# Patient Record
Sex: MALE | Race: WHITE | Employment: FULL TIME | URBAN - METROPOLITAN AREA
[De-identification: names, ages, dates, MRNs, and addresses within clinical notes are randomized per-mention and may not be internally consistent; named-entity substitution may affect disease eponyms.]

---

## 2018-08-13 ENCOUNTER — OFFICE VISIT (OUTPATIENT)
Dept: FAMILY MEDICINE | Facility: CLINIC | Age: 23
End: 2018-08-13
Payer: COMMERCIAL

## 2018-08-13 VITALS
RESPIRATION RATE: 16 BRPM | WEIGHT: 212.8 LBS | DIASTOLIC BLOOD PRESSURE: 74 MMHG | SYSTOLIC BLOOD PRESSURE: 110 MMHG | OXYGEN SATURATION: 100 % | BODY MASS INDEX: 30.46 KG/M2 | HEIGHT: 70 IN | HEART RATE: 64 BPM | TEMPERATURE: 97.7 F

## 2018-08-13 DIAGNOSIS — R19.7 DIARRHEA, UNSPECIFIED TYPE: Primary | ICD-10-CM

## 2018-08-13 LAB
ALBUMIN SERPL-MCNC: 4.2 G/DL (ref 3.4–5)
ALP SERPL-CCNC: 91 U/L (ref 40–150)
ALT SERPL W P-5'-P-CCNC: 21 U/L (ref 0–70)
ANION GAP SERPL CALCULATED.3IONS-SCNC: 8 MMOL/L (ref 3–14)
AST SERPL W P-5'-P-CCNC: 12 U/L (ref 0–45)
BILIRUB SERPL-MCNC: 0.5 MG/DL (ref 0.2–1.3)
BUN SERPL-MCNC: 13 MG/DL (ref 7–30)
C COLI+JEJUNI+LARI FUSA STL QL NAA+PROBE: NOT DETECTED
CALCIUM SERPL-MCNC: 9.1 MG/DL (ref 8.5–10.1)
CHLORIDE SERPL-SCNC: 106 MMOL/L (ref 94–109)
CO2 SERPL-SCNC: 27 MMOL/L (ref 20–32)
CREAT SERPL-MCNC: 0.86 MG/DL (ref 0.66–1.25)
EC STX1 GENE STL QL NAA+PROBE: NOT DETECTED
EC STX2 GENE STL QL NAA+PROBE: NOT DETECTED
ENTERIC PATHOGEN COMMENT: NORMAL
ERYTHROCYTE [DISTWIDTH] IN BLOOD BY AUTOMATED COUNT: 12 % (ref 10–15)
ERYTHROCYTE [SEDIMENTATION RATE] IN BLOOD BY WESTERGREN METHOD: 5 MM/H (ref 0–15)
GFR SERPL CREATININE-BSD FRML MDRD: >90 ML/MIN/1.7M2
GLUCOSE SERPL-MCNC: 85 MG/DL (ref 70–99)
HCT VFR BLD AUTO: 42.9 % (ref 40–53)
HGB BLD-MCNC: 15.3 G/DL (ref 13.3–17.7)
MCH RBC QN AUTO: 29.7 PG (ref 26.5–33)
MCHC RBC AUTO-ENTMCNC: 35.7 G/DL (ref 31.5–36.5)
MCV RBC AUTO: 83 FL (ref 78–100)
NOROV GI+II ORF1-ORF2 JNC STL QL NAA+PR: NOT DETECTED
PLATELET # BLD AUTO: 272 10E9/L (ref 150–450)
POTASSIUM SERPL-SCNC: 3.8 MMOL/L (ref 3.4–5.3)
PROT SERPL-MCNC: 7.5 G/DL (ref 6.8–8.8)
RBC # BLD AUTO: 5.16 10E12/L (ref 4.4–5.9)
RVA NSP5 STL QL NAA+PROBE: NOT DETECTED
SALMONELLA SP RPOD STL QL NAA+PROBE: NOT DETECTED
SHIGELLA SP+EIEC IPAH STL QL NAA+PROBE: NOT DETECTED
SODIUM SERPL-SCNC: 141 MMOL/L (ref 133–144)
V CHOL+PARA RFBL+TRKH+TNAA STL QL NAA+PR: NOT DETECTED
WBC # BLD AUTO: 5.4 10E9/L (ref 4–11)
Y ENTERO RECN STL QL NAA+PROBE: NOT DETECTED

## 2018-08-13 PROCEDURE — 80053 COMPREHEN METABOLIC PANEL: CPT | Performed by: PHYSICIAN ASSISTANT

## 2018-08-13 PROCEDURE — 85652 RBC SED RATE AUTOMATED: CPT | Performed by: PHYSICIAN ASSISTANT

## 2018-08-13 PROCEDURE — 87177 OVA AND PARASITES SMEARS: CPT | Performed by: PHYSICIAN ASSISTANT

## 2018-08-13 PROCEDURE — 87506 IADNA-DNA/RNA PROBE TQ 6-11: CPT | Performed by: PHYSICIAN ASSISTANT

## 2018-08-13 PROCEDURE — 87209 SMEAR COMPLEX STAIN: CPT | Performed by: PHYSICIAN ASSISTANT

## 2018-08-13 PROCEDURE — 99203 OFFICE O/P NEW LOW 30 MIN: CPT | Performed by: PHYSICIAN ASSISTANT

## 2018-08-13 PROCEDURE — 85027 COMPLETE CBC AUTOMATED: CPT | Performed by: PHYSICIAN ASSISTANT

## 2018-08-13 PROCEDURE — 36415 COLL VENOUS BLD VENIPUNCTURE: CPT | Performed by: PHYSICIAN ASSISTANT

## 2018-08-13 PROCEDURE — 87329 GIARDIA AG IA: CPT | Performed by: PHYSICIAN ASSISTANT

## 2018-08-13 NOTE — MR AVS SNAPSHOT
After Visit Summary   8/13/2018    Deion Richards    MRN: [id number]           Patient Information     Date Of Birth          1995        Visit Information        Provider Department      8/13/2018 8:30 AM Samantha Brantley PA-C [facility]        Today's Diagnoses     Diarrhea, unspecified type    -  1       Follow-ups after your visit        Follow-up notes from your care team     Return in about 1 week (around 8/20/2018) for If symptoms worsen or fail to improve.      Your next 10 appointments already scheduled     Aug 13, 2018  8:30 AM CDT   Office Visit with LUIS MANUEL [place] [facility] ([facility])    [address] [address] [phone]           Bring a current list of meds and any records pertaining to this visit. For Physicals, please bring immunization records and any forms needing to be filled out. Please arrive 10 minutes early to complete paperwork.              Future tests that were ordered for you today     Open Future Orders        Priority Expected Expires Ordered    Enteric Bacteria and Virus Panel by SHONA Stool Routine  8/13/2019 8/13/2018    Ova and Parasite Exam Routine Routine  8/13/2019 8/13/2018            Who to contact     If you have questions or need follow up information about today's clinic visit or your schedule please contact [facility] directly at [phone].  Normal or non-critical lab and imaging results will be communicated to you by MyChart, letter or phone within 4 business days after the clinic has received the results. If you do not hear from us within 7 days, please contact the clinic through MyChart or phone. If you have a critical or abnormal lab result, we will notify you by phone as soon as possible.  Submit refill requests through [employer] or call your pharmacy and they will forward the refill request to us. Please allow 3 business days for  "your refill to be completed.          Additional Information About Your Visit        [employer] Interactive[employer] Information     Re[employer]Lync lets you send messages to your doctor, view your test results, renew your prescriptions, schedule appointments and more. To sign up, go to [email or web address] . Click on \"Log in\" on the left side of the screen, which will take you to the Welcome page. Then click on \"Sign up Now\" on the right side of the page.     You will be asked to enter the access code listed below, as well as some personal information. Please follow the directions to create your username and password.     Your access code is: [email or web address]  Expires: 2018  7:51 AM     Your access code will  in 90 days. If you need help or a new code, please call your [place] clinic or [phone].        Care EveryWhere ID     This is your [facility] EveryWhere ID. This could be used by other organizations to access your [place] medical records  [id number]        Your Vitals Were     Pulse Temperature Respirations Height Pulse Oximetry BMI (Body Mass Index)    64 97.7  F (36.5  C) (Oral) 16 5' 10\" (1.778 m) 100% 30.53 kg/m2       Blood Pressure from Last 3 Encounters:   18 110/74   12 116/66    Weight from Last 3 Encounters:   18 212 lb 12.8 oz (96.5 kg)   12 165 lb (74.8 kg) (77 %)*   11 164 lb 4 oz (74.5 kg) (79 %)*     * Growth percentiles are based on CDC 2-20 Years data.              We Performed the Following     CBC with platelets     Comprehensive metabolic panel     ESR: Erythrocyte sedimentation rate        Primary Care Provider Office Phone # Fax #    [facility] [phone]       [address]  [facility] [address]        Equal Access to Services     SABA RENEE : Shun Meehan, waroseda luqadaha, qaybta kaalneeraj irizarry . [facility] [phone].    ATENCIÓN: Si yuri albrecht, tiene a frankel disposición " servicios gratuitos de asistencia lingüística. Rekha godwin [phone].    We comply with applicable federal civil rights laws and [place] laws. We do not discriminate on the basis of race, color, national origin, age, disability, sex, sexual orientation, or gender identity.            Thank you!     Thank you for choosing [facility]  for your care. Our goal is always to provide you with excellent care. Hearing back from our patients is one way we can continue to improve our services. Please take a few minutes to complete the written survey that you may receive in the mail after your visit with us. Thank you!             Your Updated Medication List - Protect others around you: Learn how to safely use, store and throw away your medicines at www.disposemymeds.org.          This list is accurate as of 8/13/18  7:51 AM.  Always use your most recent med list.                   Brand Name Dispense Instructions for use Diagnosis    NO ACTIVE MEDICATIONS

## 2018-08-13 NOTE — PROGRESS NOTES
SUBJECTIVE:   Deion Richards is a 22 year old male who presents to clinic today for the following health issues:      Diarrhea      Duration: one week    Description:       Consistency of stool: runny, loose, yellow and green       Blood in stool: no        Number of loose stools past 24 hours: 5    Intensity:  moderate    Accompanying signs and symptoms:       Fever: no        Nausea/vomitting: no        Abdominal pain: YES- only before he has a BM then it goes away       Weight loss: no     History (recent antibiotics or travel/ill contacts/med changes/testing done): none    Precipitating or alleviating factors: None    Therapies tried and outcome: increase fluids and OTC probiotic didn't help    Patient is here today for evaluation of diarrhea  Ongoing for about 1 week  He notes that the diarrhea started out of the blue  He has been having about 4-6 episodes per day, started as yellow diarrhea, now more green, without bloody stools  Denies fever, chills, nausea or vomiting  Admits to some crampy abdominal pain prior to loose stools otherwise no pain  No urinary complaints  Is taking nothing for this    No recent travel, no antibiotic use, no one else is sick at home  Does have family hx of UC  He does admit to drinking out of a garden hose recently    Problem list and histories reviewed & adjusted, as indicated.  Additional history: as documented    Patient Active Problem List   Diagnosis     Knee pain     Patellar tendonitis     History reviewed. No pertinent surgical history.    Social History   Substance Use Topics     Smoking status: Never Smoker     Smokeless tobacco: Never Used     Alcohol use Yes      Comment: wine     Family History   Problem Relation Age of Onset     Ulcerative Colitis Father      Ulcerative Colitis Maternal Grandfather      Colon Cancer Maternal Grandfather      Diabetes Maternal Grandfather      Obsessive Compulsive Disorder Maternal Grandfather      Asthma Maternal Grandfather       "    Current Outpatient Prescriptions   Medication Sig Dispense Refill     NO ACTIVE MEDICATIONS        Allergies   Allergen Reactions     Augmentin        Reviewed and updated as needed this visit by clinical staff  Tobacco  Allergies  Meds  Med Hx  Surg Hx  Fam Hx  Soc Hx      Reviewed and updated as needed this visit by Provider         ROS:  Constitutional, HEENT, cardiovascular, pulmonary, gi and gu systems are negative, except as otherwise noted.    OBJECTIVE:     /74 (BP Location: Right arm, Patient Position: Chair, Cuff Size: Adult Large)  Pulse 64  Temp 97.7  F (36.5  C) (Oral)  Resp 16  Ht 5' 10\" (1.778 m)  Wt 212 lb 12.8 oz (96.5 kg)  SpO2 100%  BMI 30.53 kg/m2  Body mass index is 30.53 kg/(m^2).  GENERAL: healthy, alert and no distress  NECK: no adenopathy, no asymmetry, masses, or scars and thyroid normal to palpation  RESP: lungs clear to auscultation - no rales, rhonchi or wheezes  CV: regular rate and rhythm, normal S1 S2, no S3 or S4, no murmur, click or rub, no peripheral edema and peripheral pulses strong  ABDOMEN: soft, nontender, no hepatosplenomegaly, no masses and bowel sounds normal  MS: no gross musculoskeletal defects noted, no edema    Diagnostic Test Results:  none     ASSESSMENT/PLAN:             1. Diarrhea, unspecified type  New problem, unclear cause.  Will obtain labs and stool studies, including O/P and Giardia.  Recommend BRAT diet, pushing fluids and avoidance of anti-diarrheal medication.  Recommend if fever, bloody stools, or if abdominal pain go to ER in interim.  - Enteric Bacteria and Virus Panel by SHOAN Stool; Future  - Ova and Parasite Exam Routine; Future  - Comprehensive metabolic panel  - CBC with platelets  - ESR: Erythrocyte sedimentation rate  - Giardia antigen; Future    Risks, benefits and alternatives were discussed with patient. Agreeable to the plan of care.      Samantha Brantley PA-C  [facility]  "

## 2018-08-14 LAB
G LAMBLIA AG STL QL IA: NORMAL
O+P STL MICRO: NORMAL
O+P STL MICRO: NORMAL
SPECIMEN SOURCE: NORMAL
SPECIMEN SOURCE: NORMAL

## 2018-08-15 ENCOUNTER — TELEPHONE (OUTPATIENT)
Dept: FAMILY MEDICINE | Facility: CLINIC | Age: 23
End: 2018-08-15

## 2018-08-15 NOTE — TELEPHONE ENCOUNTER
Please call patient:     Stool testing was all negative, is he continuing to have diarrhea?     There is one more stool test we could run for C diff, though unlikely given history. And if that is negative and symptoms persist, would recommend GI referral.     Please see if patient would like to do the last stool test, if so route to me to place.      Samantha Brantley PA-C     1st attempt, LM for pt to call back.  Lynne Giles CMA ([facility])

## 2018-08-15 NOTE — TELEPHONE ENCOUNTER
Patient informed of below.   Symptoms are improving. Will call if symptoms return or do not completely resolve.

## 2021-03-19 PROCEDURE — 93005 ELECTROCARDIOGRAM TRACING: CPT

## 2021-03-19 PROCEDURE — 250N000013 HC RX MED GY IP 250 OP 250 PS 637: Performed by: EMERGENCY MEDICINE

## 2021-03-19 PROCEDURE — 99283 EMERGENCY DEPT VISIT LOW MDM: CPT

## 2021-03-19 RX ORDER — DIPHENHYDRAMINE HCL 25 MG
50 CAPSULE ORAL ONCE
Status: COMPLETED | OUTPATIENT
Start: 2021-03-19 | End: 2021-03-19

## 2021-03-19 RX ADMIN — DIPHENHYDRAMINE HYDROCHLORIDE 50 MG: 25 CAPSULE ORAL at 21:50

## 2021-03-20 ENCOUNTER — HOSPITAL ENCOUNTER (EMERGENCY)
Facility: CLINIC | Age: 26
Discharge: HOME | End: 2021-03-20
Attending: EMERGENCY MEDICINE | Admitting: EMERGENCY MEDICINE

## 2021-03-20 VITALS
WEIGHT: 215 LBS | RESPIRATION RATE: 16 BRPM | DIASTOLIC BLOOD PRESSURE: 77 MMHG | OXYGEN SATURATION: 100 % | BODY MASS INDEX: 30.85 KG/M2 | HEART RATE: 80 BPM | TEMPERATURE: 98.5 F | SYSTOLIC BLOOD PRESSURE: 138 MMHG

## 2021-03-20 DIAGNOSIS — R00.2 PALPITATIONS: ICD-10-CM

## 2021-03-20 DIAGNOSIS — T78.40XA ALLERGIC REACTION, INITIAL ENCOUNTER: ICD-10-CM

## 2021-03-20 RX ORDER — CETIRIZINE HYDROCHLORIDE 10 MG/1
10 TABLET ORAL 2 TIMES DAILY PRN
Qty: 20 TABLET | Refills: 0 | Status: SHIPPED | OUTPATIENT
Start: 2021-03-20 | End: 2021-03-30

## 2021-03-20 ASSESSMENT — ENCOUNTER SYMPTOMS
NAUSEA: 0
VOMITING: 0
NERVOUS/ANXIOUS: 1
PALPITATIONS: 1
ABDOMINAL PAIN: 0
COLOR CHANGE: 1
TROUBLE SWALLOWING: 1
SHORTNESS OF BREATH: 0

## 2021-03-20 NOTE — ED TRIAGE NOTES
Pt arrives with complaints of L neck itchiness and swelling that he noticed fairly soon after leaving a store this afternoon around 1400. Pt denies new exposures, had a regular day he reports. SKin is erythematous and warm with exterior swelling, but says that swallowing has been more difficult since about 1800. Says eyes are itchy, clearing throat in triage. Pt also endorses anxiety. ABCs intact, A/O x4.

## 2021-03-20 NOTE — ED PROVIDER NOTES
History     Chief Complaint:  Neck Swelling and Allergic Reaction      HPI  Deion Richards is a 25 year old male who presents to the emergency department for evaluation of a rash. Patient states at 2pm today he had a sudden onset of itchiness and redness on the left side of his neck. He used lotion which temporarily improved his symptoms. However, the redness and itchiness worsened and he began feeling like his throat was closing, making it difficult for him to swallow. He denies difficulty breathing. He subsequently presented for evaluation. He denies any known allergies other than Augmentin. No recent antibiotics or medications. No abdominal pain, nausea, and vomiting. No history of asthma or eczema. No family history of anaphylaxis. He states this has never happened before.    Of note, patient had COVID in early February and since recovering he has had increased anxiety with intermittent episodes of palpitations, worse when lying down. He notes these symptoms occured while exerting himself at the gym.    Review of Systems   HENT: Positive for trouble swallowing.    Respiratory: Negative for shortness of breath.    Cardiovascular: Positive for palpitations.   Gastrointestinal: Negative for abdominal pain, nausea and vomiting.   Skin: Positive for color change and rash.   Psychiatric/Behavioral: The patient is nervous/anxious.    All other systems reviewed and are negative.    Allergies:  Augmentin    Medications:    The patient is not currently taking any prescribed medications.    Past Medical History:    The patient does not have any past medical history.    Family History:    UC    Social History:  The patient presents to the emergency department alone.  The patient is employed.      Physical Exam     Patient Vitals for the past 24 hrs:   BP Temp Temp src Pulse Resp SpO2 Weight   03/20/21 0124 138/77 -- -- 80 16 100 % --   03/20/21 0030 -- -- -- -- -- 98 % --   03/19/21 2115 (!) 149/85 98.5  F (36.9  C)  Temporal 65 20 100 % 97.5 kg (215 lb)         Physical Exam  Eyes: No discharge, symmetrical lids  ENT: Moist mucous membranes, no ear discharge. No angioedema. Voice normal. No stridor.  Neck: Full range of motion  Respiratory: CTAB, no wheezes  Cardiovascular: Regular rate and rhythm, no lower extremity edema  Chest: Equal rise  Gastrointestinal: Soft. Nondistended. NTTP. No rebound or guarding  Musculoskeletal: No gross deformities.   Skin: Warm and well perfused. Faint erythematous blanching nontender rash to lateral L neck.  Neurologic: Moves all extremities, speech fluent without dysarthria  Psychiatric: Appropriate affect, alert and interactive    Emergency Department Course   ECG  ECG taken at 111, ECG read at 112  Normal sinus rhythm with sinus arrhythmia. Incomplete RBBB. Borderline ECG.  No prior EKG.  Rate 74 bpm. SD interval 140 ms. QRS duration 116 ms. QT/QTc 384/426 ms. P-R-T axes 26 63 54.     Emergency Department Course:  Reviewed:  I reviewed the patient's nursing notes, vitals, past medical records, Care Everywhere.     Assessments:  0055 I assessed the patient. Exam findings described above.    118 I reassessed and updated the patient.     Interventions:  2150 Benadryl 50 mg Oral     Disposition:  Discharged to home.    Impression & Plan    Medical Decision Making:  Deion Richards is a 25 year old male who presents for evaluation of rash.  This is likely consistent with allergic phenomena.  This appears to be at this point an isolated rash without signs of angioedema, respiratory compromise, shock, serious systemic reaction, etc.  He looks overall  well here initially and after observation with detailed physical exam and history to look for a more serious allergic reaction/anaphylaxis.  No signs of serious infection, cellulitis, vasculitis, or other skin manifestation of a serious underlying disease.  Supportive outpatient management is indicated, medications for discharge noted above.  Close  followup with primary care physician.  Return if  wheezing, progressive shortness of breath, facial or throat/tongue swelling.      Patient also expressed concern for palpitations. ECG shows no ischemic changes.  A broad differential diagnosis was considered including SVT, Atrial fibrillation, ventricular arrhythmia, thyroid disease, acute electrolyte abnormality,  drugs/medications, caffeine intake or other stimulants, medication side effect, anemia, heart disease, PE, etc.  The workup and exam here in ED would indicate that supportive outpatient management is indicated.  I doubt PE as patient does not have chest pain or respiratory complaints.  Doubt acute coronary syndrome given symptoms and exam.  PT has a home Cardia monitor; viewed his telemetry strip on his mobile phone, which demonstrates normal sinus rhythm during his episodes.  Will have them follow up with PCP.        Diagnosis:    ICD-10-CM    1. Allergic reaction, initial encounter  T78.40XA    2. Palpitations  R00.2        Discharge Medications:  Discharge Medication List as of 3/20/2021  1:22 AM      START taking these medications    Details   cetirizine (ZYRTEC) 10 MG tablet Take 1 tablet (10 mg) by mouth 2 times daily as needed for allergies (1 tab up to twice a day as needed for itch, rash, hives, allergy), Disp-20 tablet, R-0, Local Print               Maximilian Hutchinson  3/20/2021   EMERGENCY DEPARTMENT  Scribe Disclosure:  I, Maximilian Hutchinson, am serving as a scribe at 12:55 AM on 3/20/2021 to document services personally performed by Dawood Landa MD based on my observations and the provider's statements to me.        Dawood Landa MD  03/20/21 0126       Dawood Landa MD  03/20/21 0127

## 2021-03-22 LAB — INTERPRETATION ECG - MUSE: NORMAL

## 2021-04-02 ENCOUNTER — OFFICE VISIT (OUTPATIENT)
Dept: FAMILY MEDICINE | Facility: CLINIC | Age: 26
End: 2021-04-02
Payer: COMMERCIAL

## 2021-04-02 VITALS
HEIGHT: 70 IN | TEMPERATURE: 98 F | RESPIRATION RATE: 16 BRPM | OXYGEN SATURATION: 98 % | DIASTOLIC BLOOD PRESSURE: 70 MMHG | BODY MASS INDEX: 29.84 KG/M2 | WEIGHT: 208.4 LBS | HEART RATE: 77 BPM | SYSTOLIC BLOOD PRESSURE: 120 MMHG

## 2021-04-02 DIAGNOSIS — Z00.00 ROUTINE GENERAL MEDICAL EXAMINATION AT A HEALTH CARE FACILITY: Primary | ICD-10-CM

## 2021-04-02 DIAGNOSIS — F41.9 ANXIETY: ICD-10-CM

## 2021-04-02 PROCEDURE — 99213 OFFICE O/P EST LOW 20 MIN: CPT | Mod: 25 | Performed by: NURSE PRACTITIONER

## 2021-04-02 PROCEDURE — 99395 PREV VISIT EST AGE 18-39: CPT | Performed by: NURSE PRACTITIONER

## 2021-04-02 ASSESSMENT — ENCOUNTER SYMPTOMS
MYALGIAS: 1
ARTHRALGIAS: 1
FEVER: 0
WEAKNESS: 0
COUGH: 0
HEMATURIA: 0
HEADACHES: 0
JOINT SWELLING: 0
DIZZINESS: 0
ABDOMINAL PAIN: 0
HEARTBURN: 0
HEMATOCHEZIA: 0
PARESTHESIAS: 0
CONSTIPATION: 0
CHILLS: 0
FREQUENCY: 0
EYE PAIN: 0
PALPITATIONS: 1
DYSURIA: 0
SHORTNESS OF BREATH: 0
SORE THROAT: 0
DIARRHEA: 0
NAUSEA: 0
NERVOUS/ANXIOUS: 1

## 2021-04-02 ASSESSMENT — ANXIETY QUESTIONNAIRES
7. FEELING AFRAID AS IF SOMETHING AWFUL MIGHT HAPPEN: NEARLY EVERY DAY
5. BEING SO RESTLESS THAT IT IS HARD TO SIT STILL: SEVERAL DAYS
3. WORRYING TOO MUCH ABOUT DIFFERENT THINGS: NEARLY EVERY DAY
GAD7 TOTAL SCORE: 12
2. NOT BEING ABLE TO STOP OR CONTROL WORRYING: MORE THAN HALF THE DAYS
1. FEELING NERVOUS, ANXIOUS, OR ON EDGE: MORE THAN HALF THE DAYS
6. BECOMING EASILY ANNOYED OR IRRITABLE: NOT AT ALL
IF YOU CHECKED OFF ANY PROBLEMS ON THIS QUESTIONNAIRE, HOW DIFFICULT HAVE THESE PROBLEMS MADE IT FOR YOU TO DO YOUR WORK, TAKE CARE OF THINGS AT HOME, OR GET ALONG WITH OTHER PEOPLE: SOMEWHAT DIFFICULT

## 2021-04-02 ASSESSMENT — PATIENT HEALTH QUESTIONNAIRE - PHQ9: 5. POOR APPETITE OR OVEREATING: SEVERAL DAYS

## 2021-04-02 ASSESSMENT — MIFFLIN-ST. JEOR: SCORE: 1931.55

## 2021-04-02 NOTE — PROGRESS NOTES
"SUBJECTIVE:   CC: Deion Richards is an 25 year old male who presents for preventative health visit.       Patient has been advised of split billing requirements and indicates understanding: Yes  Healthy Habits:     Getting at least 3 servings of Calcium per day:  Yes    Bi-annual eye exam:  NO    Dental care twice a year:  NO    Sleep apnea or symptoms of sleep apnea:  None    Diet:  Regular (no restrictions)    Frequency of exercise:  4-5 days/week    Duration of exercise:  Greater than 60 minutes    Taking medications regularly:  Not Applicable    Medication side effects:  Not applicable    PHQ-2 Total Score: 2    Additional concerns today:  Yes    Had Covid early February.    Symptoms lasted 30-40 hours.   Started to have palpitations 1.5 weeks after diagnosed with Covid.   Has been having intermittent palpitations, better now.    Palpitations were very pronounced with exercise, which \"freaked\" him out.    When doing the laundry recently he checked HR with a home machine and it said \"tachycardia\".  Hasn't been back to the gym since Covid diagnosis.   Stress and anxiety seem to trigger palpitations.   Has felt anxious in the past, but never been diagnosed or treated with medication.   He was talking with spouse and thinks it may be time to start medication for anxiety.   Family hx of anxiety.   Doesn't drink caffeine.   Recent normal EKG in the ER.  Was seen yesterday at  with cardiology, labs were normal.   Cardiology was not concerned about his symptoms.   For peace of mind is going to do a stress test.    Sleeping well at night.     CRISTOBAL-7 SCORE 4/2/2021   Total Score 12       Today's PHQ-2 Score:   PHQ-2 ( 1999 Pfizer) 4/2/2021   Q1: Little interest or pleasure in doing things 1   Q2: Feeling down, depressed or hopeless 1   PHQ-2 Score 2   Q1: Little interest or pleasure in doing things Several days   Q2: Feeling down, depressed or hopeless Several days   PHQ-2 Score 2       Abuse: Current or Past(Physical, " Sexual or Emotional)- No  Do you feel safe in your environment? Yes    Have you ever done Advance Care Planning? (For example, a Health Directive, POLST, or a discussion with a medical provider or your loved ones about your wishes): No, advance care planning information given to patient to review.  Patient declined advance care planning discussion at this time.    Social History     Tobacco Use     Smoking status: Never Smoker     Smokeless tobacco: Never Used   Substance Use Topics     Alcohol use: Yes     Comment: wine or bourbon or beer- once a week, if not at all.         Alcohol Use 4/2/2021   Prescreen: >3 drinks/day or >7 drinks/week? No       Last PSA: No results found for: PSA    Reviewed orders with patient. Reviewed health maintenance and updated orders accordingly - Yes  Labs reviewed in EPIC  BP Readings from Last 3 Encounters:   04/02/21 120/70   03/20/21 138/77   08/13/18 110/74    Wt Readings from Last 3 Encounters:   04/02/21 94.5 kg (208 lb 6.4 oz)   03/19/21 97.5 kg (215 lb)   08/13/18 96.5 kg (212 lb 12.8 oz)                  Current Outpatient Medications   Medication Sig Dispense Refill     sertraline (ZOLOFT) 50 MG tablet Take 1 tablet (50 mg) by mouth daily Take 1/2 tab by mouth for 5 days then start 1 tab daily 30 tablet 1     NO ACTIVE MEDICATIONS        Allergies   Allergen Reactions     Augmentin        Reviewed and updated as needed this visit by clinical staff  Tobacco  Allergies  Meds  Problems  Med Hx  Surg Hx  Fam Hx  Soc Hx          Reviewed and updated as needed this visit by Provider  Tobacco  Allergies  Meds  Problems  Med Hx  Surg Hx  Fam Hx         History reviewed. No pertinent past medical history.   History reviewed. No pertinent surgical history.    Review of Systems   Constitutional: Negative for chills and fever.   HENT: Negative for congestion, ear pain and sore throat.    Eyes: Negative for pain and visual disturbance.   Respiratory: Negative for cough and  "shortness of breath.    Cardiovascular: Positive for palpitations. Negative for chest pain and peripheral edema.   Gastrointestinal: Negative for abdominal pain, constipation, diarrhea, heartburn, hematochezia and nausea.   Genitourinary: Negative for dysuria, frequency, genital sores, hematuria and urgency.   Musculoskeletal: Positive for arthralgias and myalgias. Negative for joint swelling.   Skin: Negative for rash.   Neurological: Negative for dizziness, weakness, headaches and paresthesias.   Psychiatric/Behavioral: Negative for mood changes. The patient is nervous/anxious.        OBJECTIVE:   /70 (BP Location: Right arm, Patient Position: Sitting, Cuff Size: Adult Regular)   Pulse 77   Temp 98  F (36.7  C) (Oral)   Resp 16   Ht 1.778 m (5' 10\")   Wt 94.5 kg (208 lb 6.4 oz)   SpO2 98%   BMI 29.90 kg/m      Physical Exam  GENERAL: healthy, alert and no distress  EYES: Eyes grossly normal to inspection, PERRL and conjunctivae and sclerae normal  HENT: ear canals and TM's normal, nose and mouth without ulcers or lesions  NECK: no adenopathy, no asymmetry, masses, or scars and thyroid normal to palpation  RESP: lungs clear to auscultation - no rales, rhonchi or wheezes  CV: regular rate and rhythm, normal S1 S2, no S3 or S4, no murmur, click or rub, no peripheral edema and peripheral pulses strong  ABDOMEN: soft, nontender, no hepatosplenomegaly, no masses and bowel sounds normal  MS: no gross musculoskeletal defects noted, no edema  SKIN: no suspicious lesions or rashes  NEURO: Normal strength and tone, mentation intact and speech normal  PSYCH: mentation appears normal, affect normal/bright    Diagnostic Test Results:  Labs reviewed in Epic    ASSESSMENT/PLAN:   1. Routine general medical examination at a health care facility  Normal exam today.     2. Anxiety  New dx.  Starting medication; risks, benefits and side effects discussed.  Follow-up 3-4 weeks, video appt.   - sertraline (ZOLOFT) 50 MG " "tablet; Take 1 tablet (50 mg) by mouth daily Take 1/2 tab by mouth for 5 days then start 1 tab daily  Dispense: 30 tablet; Refill: 1      COUNSELING:   Reviewed preventive health counseling, as reflected in patient instructions       Regular exercise       Healthy diet/nutrition       Consider Hep C screening for all patients one time for ages 18-79 years       HIV screeninx in teen years, 1x in adult years, and at intervals if high risk    Estimated body mass index is 29.9 kg/m  as calculated from the following:    Height as of this encounter: 1.778 m (5' 10\").    Weight as of this encounter: 94.5 kg (208 lb 6.4 oz).     Weight management plan: Discussed healthy diet and exercise guidelines    He reports that he has never smoked. He has never used smokeless tobacco.      Counseling Resources:  ATP IV Guidelines  Pooled Cohorts Equation Calculator  FRAX Risk Assessment  ICSI Preventive Guidelines  Dietary Guidelines for Americans,   USDA's MyPlate  ASA Prophylaxis  Lung CA Screening    PHILIP Mendez CNP  [facility]  "

## 2021-04-03 ASSESSMENT — ANXIETY QUESTIONNAIRES: GAD7 TOTAL SCORE: 12

## 2021-06-07 DIAGNOSIS — F41.9 ANXIETY: ICD-10-CM

## 2021-06-11 NOTE — TELEPHONE ENCOUNTER
Patient has appt scheduled.     Prescription approved per [facility] protocol.    Brenda Canela RN on 6/11/2021 at 2:06 PM

## 2021-06-16 ENCOUNTER — VIRTUAL VISIT (OUTPATIENT)
Dept: FAMILY MEDICINE | Facility: CLINIC | Age: 26
End: 2021-06-16
Payer: COMMERCIAL

## 2021-06-16 DIAGNOSIS — F41.9 ANXIETY: ICD-10-CM

## 2021-06-16 PROCEDURE — 99213 OFFICE O/P EST LOW 20 MIN: CPT | Mod: GT | Performed by: NURSE PRACTITIONER

## 2021-06-16 ASSESSMENT — ANXIETY QUESTIONNAIRES
7. FEELING AFRAID AS IF SOMETHING AWFUL MIGHT HAPPEN: NOT AT ALL
1. FEELING NERVOUS, ANXIOUS, OR ON EDGE: SEVERAL DAYS
GAD7 TOTAL SCORE: 1
5. BEING SO RESTLESS THAT IT IS HARD TO SIT STILL: NOT AT ALL
6. BECOMING EASILY ANNOYED OR IRRITABLE: NOT AT ALL
IF YOU CHECKED OFF ANY PROBLEMS ON THIS QUESTIONNAIRE, HOW DIFFICULT HAVE THESE PROBLEMS MADE IT FOR YOU TO DO YOUR WORK, TAKE CARE OF THINGS AT HOME, OR GET ALONG WITH OTHER PEOPLE: NOT DIFFICULT AT ALL
3. WORRYING TOO MUCH ABOUT DIFFERENT THINGS: NOT AT ALL
2. NOT BEING ABLE TO STOP OR CONTROL WORRYING: NOT AT ALL

## 2021-06-16 ASSESSMENT — PATIENT HEALTH QUESTIONNAIRE - PHQ9
5. POOR APPETITE OR OVEREATING: NOT AT ALL
SUM OF ALL RESPONSES TO PHQ QUESTIONS 1-9: 0

## 2021-06-16 NOTE — PROGRESS NOTES
"Deion is a 25 year old who is being evaluated via a billable video visit.      How would you like to obtain your AVS? MyChart  If the video visit is dropped, the invitation should be resent by: Text to cell phone: [phone]  Will anyone else be joining your video visit? No      Video Start Time: 1:46 PM    Assessment & Plan     Anxiety  Much improved since starting zoloft.  Will continue on current dose and follow-up in 1 year; sooner prn.   - sertraline (ZOLOFT) 50 MG tablet; Take 1 tablet (50 mg) by mouth daily                 Return in about 1 year (around 6/16/2022) for Preventive Visit, anxiety follow up , In-Clinic Visit.    PHILIP Mendez CNP  [facility]    Subjective   Deion is a 25 year old who presents for the following health issues     HPI     Medication Followup of zoloft 50 mg     Taking Medication as prescribed: yes    Side Effects:  None    Medication Helping Symptoms:  yes     CRISTOBAL-7 SCORE 4/2/2021 6/16/2021   Total Score 12 1     PHQ 6/16/2021   PHQ-9 Total Score 0   Q9: Thoughts of better off dead/self-harm past 2 weeks Not at all     He has felt \"99% better\" since starting zoloft.  He is feeling calm and HR is no longer high.  He is no longer feeling palpitations (unless doing an intense cardio workout).  Work is going well.  Denies side effects.      Review of Systems   Constitutional, HEENT, cardiovascular, pulmonary, gi and gu and psych systems are negative, except as otherwise noted.      Objective           Vitals:  No vitals were obtained today due to virtual visit.    Physical Exam   GENERAL: Healthy, alert and no distress  EYES: Eyes grossly normal to inspection.  No discharge or erythema, or obvious scleral/conjunctival abnormalities.  RESP: No audible wheeze, cough, or visible cyanosis.  No visible retractions or increased work of breathing.    SKIN: Visible skin clear. No significant rash, abnormal pigmentation or lesions.  NEURO: Cranial nerves " grossly intact.  Mentation and speech appropriate for age.  PSYCH: Mentation appears normal, affect normal/bright, judgement and insight intact, normal speech and appearance well-groomed.    No results found for this or any previous visit (from the past 24 hour(s)).            Video-Visit Details    Type of service:  Video Visit    Video End Time:1:52 PM    Originating Location (pt. Location): Home    Distant Location (provider location):  [facility]     Platform used for Video Visit: [employer]

## 2021-06-17 ASSESSMENT — ANXIETY QUESTIONNAIRES: GAD7 TOTAL SCORE: 1

## 2021-07-13 DIAGNOSIS — F41.9 ANXIETY: ICD-10-CM

## 2021-07-19 ENCOUNTER — MYC MEDICAL ADVICE (OUTPATIENT)
Dept: FAMILY MEDICINE | Facility: CLINIC | Age: 26
End: 2021-07-19

## 2021-07-19 DIAGNOSIS — F41.9 ANXIETY: ICD-10-CM

## 2021-10-02 ENCOUNTER — HEALTH MAINTENANCE LETTER (OUTPATIENT)
Age: 26
End: 2021-10-02

## 2021-12-09 PROCEDURE — 96361 HYDRATE IV INFUSION ADD-ON: CPT

## 2021-12-09 PROCEDURE — 99284 EMERGENCY DEPT VISIT MOD MDM: CPT | Mod: 25

## 2021-12-09 PROCEDURE — 96375 TX/PRO/DX INJ NEW DRUG ADDON: CPT

## 2021-12-09 PROCEDURE — 96374 THER/PROPH/DIAG INJ IV PUSH: CPT

## 2021-12-09 PROCEDURE — C9803 HOPD COVID-19 SPEC COLLECT: HCPCS

## 2021-12-10 ENCOUNTER — HOSPITAL ENCOUNTER (EMERGENCY)
Facility: CLINIC | Age: 26
Discharge: HOME | End: 2021-12-10
Attending: EMERGENCY MEDICINE | Admitting: EMERGENCY MEDICINE
Payer: COMMERCIAL

## 2021-12-10 VITALS
SYSTOLIC BLOOD PRESSURE: 129 MMHG | OXYGEN SATURATION: 99 % | HEART RATE: 90 BPM | RESPIRATION RATE: 20 BRPM | TEMPERATURE: 99.2 F | DIASTOLIC BLOOD PRESSURE: 63 MMHG

## 2021-12-10 DIAGNOSIS — J10.1 INFLUENZA A: ICD-10-CM

## 2021-12-10 DIAGNOSIS — R10.9 ACUTE ABDOMINAL PAIN: ICD-10-CM

## 2021-12-10 DIAGNOSIS — J02.0 ACUTE STREPTOCOCCAL PHARYNGITIS: ICD-10-CM

## 2021-12-10 LAB
ANION GAP SERPL CALCULATED.3IONS-SCNC: 8 MMOL/L (ref 3–14)
BASOPHILS # BLD AUTO: 0 10E3/UL (ref 0–0.2)
BASOPHILS NFR BLD AUTO: 1 %
BUN SERPL-MCNC: 11 MG/DL (ref 7–30)
CALCIUM SERPL-MCNC: 9.4 MG/DL (ref 8.5–10.1)
CHLORIDE BLD-SCNC: 106 MMOL/L (ref 94–109)
CO2 SERPL-SCNC: 23 MMOL/L (ref 20–32)
CREAT SERPL-MCNC: 0.94 MG/DL (ref 0.66–1.25)
DEPRECATED S PYO AG THROAT QL EIA: POSITIVE
EOSINOPHIL # BLD AUTO: 0 10E3/UL (ref 0–0.7)
EOSINOPHIL NFR BLD AUTO: 0 %
ERYTHROCYTE [DISTWIDTH] IN BLOOD BY AUTOMATED COUNT: 11.8 % (ref 10–15)
FLUAV RNA SPEC QL NAA+PROBE: POSITIVE
FLUBV RNA RESP QL NAA+PROBE: NEGATIVE
GFR SERPL CREATININE-BSD FRML MDRD: >90 ML/MIN/1.73M2
GLUCOSE BLD-MCNC: 125 MG/DL (ref 70–99)
HCT VFR BLD AUTO: 42.6 % (ref 40–53)
HGB BLD-MCNC: 14.8 G/DL (ref 13.3–17.7)
IMM GRANULOCYTES # BLD: 0 10E3/UL
IMM GRANULOCYTES NFR BLD: 0 %
LYMPHOCYTES # BLD AUTO: 0.6 10E3/UL (ref 0.8–5.3)
LYMPHOCYTES NFR BLD AUTO: 6 %
MCH RBC QN AUTO: 29.2 PG (ref 26.5–33)
MCHC RBC AUTO-ENTMCNC: 34.7 G/DL (ref 31.5–36.5)
MCV RBC AUTO: 84 FL (ref 78–100)
MONOCYTES # BLD AUTO: 0.6 10E3/UL (ref 0–1.3)
MONOCYTES NFR BLD AUTO: 7 %
NEUTROPHILS # BLD AUTO: 7.3 10E3/UL (ref 1.6–8.3)
NEUTROPHILS NFR BLD AUTO: 86 %
NRBC # BLD AUTO: 0 10E3/UL
NRBC BLD AUTO-RTO: 0 /100
PLATELET # BLD AUTO: 250 10E3/UL (ref 150–450)
POTASSIUM BLD-SCNC: 3.4 MMOL/L (ref 3.4–5.3)
RBC # BLD AUTO: 5.06 10E6/UL (ref 4.4–5.9)
SARS-COV-2 RNA RESP QL NAA+PROBE: NEGATIVE
SODIUM SERPL-SCNC: 137 MMOL/L (ref 133–144)
WBC # BLD AUTO: 8.6 10E3/UL (ref 4–11)

## 2021-12-10 PROCEDURE — 250N000013 HC RX MED GY IP 250 OP 250 PS 637: Performed by: EMERGENCY MEDICINE

## 2021-12-10 PROCEDURE — 80048 BASIC METABOLIC PNL TOTAL CA: CPT | Performed by: EMERGENCY MEDICINE

## 2021-12-10 PROCEDURE — 36415 COLL VENOUS BLD VENIPUNCTURE: CPT | Performed by: EMERGENCY MEDICINE

## 2021-12-10 PROCEDURE — 85025 COMPLETE CBC W/AUTO DIFF WBC: CPT | Performed by: EMERGENCY MEDICINE

## 2021-12-10 PROCEDURE — 87636 SARSCOV2 & INF A&B AMP PRB: CPT | Performed by: EMERGENCY MEDICINE

## 2021-12-10 PROCEDURE — 258N000003 HC RX IP 258 OP 636: Performed by: EMERGENCY MEDICINE

## 2021-12-10 PROCEDURE — 87880 STREP A ASSAY W/OPTIC: CPT | Performed by: EMERGENCY MEDICINE

## 2021-12-10 PROCEDURE — 250N000011 HC RX IP 250 OP 636: Performed by: EMERGENCY MEDICINE

## 2021-12-10 RX ORDER — OSELTAMIVIR PHOSPHATE 75 MG/1
75 CAPSULE ORAL 2 TIMES DAILY
Qty: 10 CAPSULE | Refills: 0 | Status: SHIPPED | OUTPATIENT
Start: 2021-12-10 | End: 2021-12-15

## 2021-12-10 RX ORDER — KETOROLAC TROMETHAMINE 15 MG/ML
10 INJECTION, SOLUTION INTRAMUSCULAR; INTRAVENOUS ONCE
Status: COMPLETED | OUTPATIENT
Start: 2021-12-10 | End: 2021-12-10

## 2021-12-10 RX ORDER — CEPHALEXIN 500 MG/1
500 CAPSULE ORAL 2 TIMES DAILY
Qty: 20 CAPSULE | Refills: 0 | Status: SHIPPED | OUTPATIENT
Start: 2021-12-10 | End: 2021-12-20

## 2021-12-10 RX ORDER — ACETAMINOPHEN 325 MG/1
975 TABLET ORAL ONCE
Status: COMPLETED | OUTPATIENT
Start: 2021-12-10 | End: 2021-12-10

## 2021-12-10 RX ORDER — ONDANSETRON 2 MG/ML
4 INJECTION INTRAMUSCULAR; INTRAVENOUS ONCE
Status: DISCONTINUED | OUTPATIENT
Start: 2021-12-10 | End: 2021-12-10 | Stop reason: HOSPADM

## 2021-12-10 RX ADMIN — KETOROLAC TROMETHAMINE 10 MG: 15 INJECTION, SOLUTION INTRAMUSCULAR; INTRAVENOUS at 02:19

## 2021-12-10 RX ADMIN — ACETAMINOPHEN 975 MG: 325 TABLET, FILM COATED ORAL at 02:19

## 2021-12-10 RX ADMIN — SODIUM CHLORIDE 1000 ML: 9 INJECTION, SOLUTION INTRAVENOUS at 02:20

## 2021-12-10 ASSESSMENT — ENCOUNTER SYMPTOMS
VOMITING: 1
SHORTNESS OF BREATH: 0
FATIGUE: 1
ABDOMINAL PAIN: 1
COUGH: 1

## 2021-12-10 NOTE — DISCHARGE INSTRUCTIONS
Discharge Instructions  Influenza    You were diagnosed today with influenza or influenza like illness.  Influenza is a respiratory (breathing) illness caused by influenza A or B viruses.  Influenza causes five primary symptoms: fever, headache, muscle aches/fatigue/malaise, sore throat and cough.  These symptoms start one to four days after you have been around a person with this illness. Influenza is spread through sneezing and coughing and can live on surfaces for several days.  It is usually contagious for 5 days but in some cases up to 10 days and often affects several family members. If you have a family member who is less than 2 years old, older than 65 years old, pregnant or has a serious medical condition, they should be seen right away by a provider to decide if they should take preventative medications. Although influenza will make you feel very ill, most patients don t require any specific treatment. An antiviral medication might be prescribed for certain groups of patients (older patients, younger patients, and those with certain chronic medical problems).    Generally, every Emergency Department visit should have a follow-up clinic visit with either a primary or a specialty clinic/provider. Please follow-up as instructed by your emergency provider today.    Return to the Emergency Department if:  You have trouble breathing.  You develop pain in your chest.  You have signs of being dehydrated, such as dizziness or unable to urinate (pee) at least three times daily.  You are confused or severely weak.  You cannot stop vomiting (throwing up) or you cannot drink enough fluids.    In children, you should seek help if the child has any of the above or if child:  Has blue or purplish skin color.  Is so irritable that he or she does not want to be held.  Does not have tears when crying (in infants) or does not urinate at least three times daily.  Does not wake up easily.    What can I do to help  myself?  Rest.  Fluids -- Drink hydrating solutions such as Gatorade  or Pedialyte  as often as you can. If you are drinking enough, you should pass urine at least every eight hours.  Tylenol  (acetaminophen) and Advil  (ibuprofen) can relieve fever, headache, and muscle aches. Do not give aspirin to children under 18 years old.   Antiviral treatment -- Antiviral medicines do not make the flu symptoms go away immediately.  They have only been shown to make the symptoms go away 12 to 24 hours sooner than they would without treatment.     Antibiotics -- Antibiotics are NOT useful for treating viral illnesses such as influenza. Antibiotics should only be used if there is a bacterial complication of the flu such as bacterial pneumonia, ear infection, or sinusitis.  Because you were diagnosed with a flu like illness you are very contagious.  This means you cannot work, attend school or  for at least 24 hours or until you no longer have a fever.  If you were given a prescription for medicine here today, be sure to read all of the information (including the package insert) that comes with your prescription.  This will include important information about the medicine, its side effects, and any warnings that you need to know about.  The pharmacist who fills the prescription can provide more information and answer questions you may have about the medicine.  If you have questions or concerns that the pharmacist cannot address, please call or return to the Emergency Department.   Remember that you can always come back to the Emergency Department if you are not able to see your regular provider in the amount of time listed above, if you get any new symptoms, or if there is anything that worries you.Discharge Instructions  Sore Throat  You were seen today for a sore throat.  Most (>80%) sore throats are caused by a virus. Antibiotics do not help with viral infections, but you can fight off the virus on your own.  In this  case, your sore throat would be treated with medications for your pain and fever.    Strep throat is a kind of sore throat caused by Group A streptococcus bacteria.  This type of sore throat is treated with antibiotics.  If you had a rapid test done today for strep throat and it did not show infection, a culture is done in some cases. The culture can take several days to complete. If the culture shows you have strep throat, we will call you and get you a prescription for antibiotics. We will not contact you with a negative culture result.  Generally, every Emergency Department visit should have a follow-up clinic visit with either a primary or a specialty clinic/provider. Please follow-up as instructed by your emergency provider today.  Return to the Emergency Department if:  If you have difficulty breathing.  If you are drooling because you are unable to swallow.  You become dehydrated due to difficulty drinking. Signs of dehydration include weakness, dry mouth, and urinating less than 3 times per day.  If you develop swelling of the neck or tongue.  If you develop a high fever with either severe or unusual headache or stiff neck.    Treatment:    Pain relief -- Non-prescription pain medications, such as Tylenol  (acetaminophen) or Motrin , Advil  (ibuprofen) are usually recommended for pain.  Do not use a medicine that you are allergic to, or if your provider has told you not to use it.  Soft or liquid diet. Concentrate on liquids to keep yourself hydrated. Cold liquids (popsicles, ice cream, etc.) may feel good on your throat.  If you were given a prescription for medicine here today, be sure to read all of the information (including the package insert) that comes with your prescription.  This will include important information about the medicine, its side effects, and any warnings that you need to know about.  The pharmacist who fills the prescription can provide more information and answer questions you may  have about the medicine.  If you have questions or concerns that the pharmacist cannot address, please call or return to the Emergency Department.   Remember that you can always come back to the Emergency Department if you are not able to see your regular provider in the amount of time listed above, if you get any new symptoms, or if there is anything that worries you.  Discharge Instructions  Abdominal Pain    Abdominal pain (belly pain) can be caused by many things. Your evaluation today does not show the exact cause for your pain. Your provider today has decided that it is unlikely your pain is due to a life threatening problem, or a problem requiring surgery or hospital admission. Sometimes those problems cannot be found right away, so it is very important that you follow up as directed.  Sometimes only the changes which occur over time allow the cause of your pain to be found.    Generally, every Emergency Department visit should have a follow-up clinic visit with either a primary or a specialty clinic/provider. Please follow-up as instructed by your emergency provider today. With abdominal pain, we often recommend very close follow-up, such as the following day.    ADULTS:  Return to the Emergency Department right away if:    You get an oral temperature above 102oF or as directed by your provider.  You have blood in your stools. This may be bright red or appear as black, tarry stools.    You keep vomiting (throwing up) or cannot drink liquids.  You see blood when you vomit.   You cannot have a bowel movement or you cannot pass gas.  Your stomach gets bloated or bigger.  Your skin or the whites of your eyes look yellow.  You faint.  You have bloody, frequent or painful urination (peeing).  You have new symptoms or anything that worries you.    CHILDREN:  Return to the Emergency Department right away if your child has any of the above-listed symptoms or the following:    Pushes your hand away or screams/cries  when his/her belly is touched.  You notice your child is very fussy or weak.  Your child is very tired and is too tired to eat or drink.  Your child is dehydrated.  Signs of dehydration can be:  Significant change in the amount of wet diapers/urine.  Your infant or child starts to have dry mouth and lips, or no saliva (spit) or tears.    PREGNANT WOMEN:  Return to the Emergency Department right away if you have any of the above-listed symptoms or the following:    You have bleeding, leaking fluid or passing tissue from the vagina.  You have worse pain or cramping, or pain in your shoulder or back.  You have vomiting that will not stop.  You have a temperature of 100oF or more.  Your baby is not moving as much as usual.  You faint.  You get a bad headache with or without eye problems and abdominal pain.  You have a seizure.  You have unusual discharge from your vagina and abdominal pain.    Abdominal pain is pretty common during pregnancy.  Your pain may or may not be related to your pregnancy. You should follow-up closely with your OB provider so they can evaluate you and your baby.  Until you follow-up with your regular provider, do the following:     Avoid sex and do not put anything in your vagina.  Drink clear fluids.  Only take medications approved by your provider.    MORE INFORMATION:    Appendicitis:  A possible cause of abdominal pain in any person who still has their appendix is acute appendicitis. Appendicitis is often hard to diagnose.  Testing does not always rule out early appendicitis or other causes of abdominal pain. Close follow-up with your provider and re-evaluations may be needed to figure out the reason for your abdominal pain.    Follow-up:  It is very important that you make an appointment with your clinic and go to the appointment.  If you do not follow-up with your primary provider, it may result in missing an important development which could result in permanent injury or disability and/or  "lasting pain.  If there is any problem keeping your appointment, call your provider or return to the Emergency Department.    Medications:  Take your medications as directed by your provider today.  Before using over-the-counter medications, ask your provider and make sure to take the medications as directed.  If you have any questions about medications, ask your provider.    Diet:  Resume your normal diet as much as possible, but do not eat fried, fatty or spicy foods while you have pain.  Do not drink alcohol or have caffeine.  Do not smoke tobacco.    Probiotics: If you have been given an antibiotic, you may want to also take a probiotic pill or eat yogurt with live cultures. Probiotics have \"good bacteria\" to help your intestines stay healthy. Studies have shown that probiotics help prevent diarrhea (loose stools) and other intestine problems (including C. diff infection) when you take antibiotics. You can buy these without a prescription in the pharmacy section of the store.     If you were given a prescription for medicine here today, be sure to read all of the information (including the package insert) that comes with your prescription.  This will include important information about the medicine, its side effects, and any warnings that you need to know about.  The pharmacist who fills the prescription can provide more information and answer questions you may have about the medicine.  If you have questions or concerns that the pharmacist cannot address, please call or return to the Emergency Department.       Remember that you can always come back to the Emergency Department if you are not able to see your regular provider in the amount of time listed above, if you get any new symptoms, or if there is anything that worries you.    "

## 2021-12-10 NOTE — ED PROVIDER NOTES
History   Chief Complaint:  Fever       HPI   Deion Richards is a 26 year old male who presents with fever. The patient stated that he started feeling mildly fatigued along with some cough and abdominal pain  2 days ago. Today he stated phlegm in his throat, feeling more abdominal pain which he thinks is gas. He also vomited twice, one with some blood in it. He works at school, is unvaccinated against covid and denies contact with anyone sick lately. He also denies shortness of breath or chest pain.    Review of Systems   Constitutional: Positive for fatigue.   Respiratory: Positive for cough. Negative for shortness of breath.    Cardiovascular: Negative for chest pain.   Gastrointestinal: Positive for abdominal pain and vomiting.   All other systems reviewed and are negative.    Allergies:  Augmentin    Medications:  Sertraline    Past Medical History:    Knee pain  Patellar tendonitis     Past Surgical History:    The patient denies past surgical history.     Family History:    Ulcerative Colitis  Father    Social History:  The patient was brought to the emergency department by private vehicle.  The patient presents to the emergency department alone.    Physical Exam     Patient Vitals for the past 24 hrs:   BP Temp Temp src Pulse Resp SpO2   12/10/21 0200 129/63 99.2  F (37.3  C) -- 90 -- 99 %   12/09/21 2313 116/57 (!) 102.1  F (38.9  C) Oral 109 20 99 %       Physical Exam  Constitutional:  Oriented to person, place, and time. Well appearing.   HENT:   Head:    Normocephalic.   Mouth/Throat:   Oropharynx is clear and moist. Posterior pharyngeal erythema. No enlarged tonsils. No uvular deviation.   Eyes:    EOM are normal. Pupils are equal, round, and reactive to light.   Neck:    Neck supple.   Cardiovascular:  Normal rate, regular rhythm and normal heart sounds.      Exam reveals no gallop and no friction rub.       No murmur heard.  Pulmonary/Chest:  Effort normal and breath sounds normal.      No  respiratory distress. No wheezes. No rales.      No reproducible chest wall pain.  Abdominal:   Soft. No distension. No tenderness. No rebound and no guarding.   Musculoskeletal:  Normal range of motion.   Neurological:   Alert and oriented to person, place, and time.           Moves all 4 extremities spontaneously    Skin:    No rash noted. No pallor.     Emergency Department Course     Laboratory:  Labs Ordered and Resulted from Time of ED Arrival to Time of ED Departure   BASIC METABOLIC PANEL - Abnormal       Result Value    Sodium 137      Potassium 3.4      Chloride 106      Carbon Dioxide (CO2) 23      Anion Gap 8      Urea Nitrogen 11      Creatinine 0.94      Calcium 9.4      Glucose 125 (*)     GFR Estimate >90     CBC WITH PLATELETS AND DIFFERENTIAL - Abnormal    WBC Count 8.6      RBC Count 5.06      Hemoglobin 14.8      Hematocrit 42.6      MCV 84      MCH 29.2      MCHC 34.7      RDW 11.8      Platelet Count 250      % Neutrophils 86      % Lymphocytes 6      % Monocytes 7      % Eosinophils 0      % Basophils 1      % Immature Granulocytes 0      NRBCs per 100 WBC 0      Absolute Neutrophils 7.3      Absolute Lymphocytes 0.6 (*)     Absolute Monocytes 0.6      Absolute Eosinophils 0.0      Absolute Basophils 0.0      Absolute Immature Granulocytes 0.0      Absolute NRBCs 0.0     INFLUENZA A/B & SARS-COV2 PCR MULTIPLEX - Abnormal    Influenza A PCR Positive (*)     Influenza B PCR Negative      SARS CoV2 PCR Negative     STREPTOCOCCUS A RAPID SCREEN W REFELX TO PCR - Abnormal    Group A Strep antigen Positive (*)       Emergency Department Course:    Reviewed:  I reviewed nursing notes, vitals and past medical history    Assessments/Consults  0113 I obtained history and examined the patient as noted above.     Interventions:  Medications   ondansetron (ZOFRAN) injection 4 mg (has no administration in time range)   0.9% sodium chloride BOLUS (0 mLs Intravenous Stopped 12/10/21 3542)   ketorolac  (TORADOL) injection 10 mg (10 mg Intravenous Given 12/10/21 0219)   acetaminophen (TYLENOL) tablet 975 mg (975 mg Oral Given 12/10/21 0219)     Disposition:  The patient was discharged to home.     Impression & Plan     Medical Decision Making:  Deion Richards is a 26 year old male who presents for evaluation of cough, fever and myalgias.  They have other symptoms including nausea and vomiting. This is consistent with influenza.   The patient is  out of treatment window for influenza and medications ordered as noted above.  They are at risk for pneumonia but no signs of this are detected on today's visit.  The rapid strep test is positive. There is no clinical evidence of peritonsillar abscess, retropharyngeal abscess, Lemierre's Syndrome, epiglottis, or Carlos Alberto's angina. The patient's symptoms are consistent with streptococcal pharyngitis.  I have recommended treatment with antibiotics and analgesics.  Return if increasing pain, change in voice, neck pain, vomiting, fever, or shortness of breath. Follow-up with primary physician if not improving in 3-5 days.Close followup of primary care physician is indicated and return to the ED for high fevers > 103 for more than 48 hours more, increasing productive cough, shortness of breath, or confusion.  There is no signs of serious bacterial infection such as bacteremia, meningitis, UTI/pyelonephritis, strep pharyngitis, etc.      Covid-19  Deion Richards was evaluated during a global COVID-19 pandemic, which necessitated consideration that the patient might be at risk for infection with the SARS-CoV-2 virus that causes COVID-19.   Applicable protocols for evaluation were followed during the patient's care.   COVID-19 was considered as part of the patient's evaluation. The plan for testing is:  a test was obtained during this visit.    Diagnosis:    ICD-10-CM    1. Influenza A  J10.1    2. Acute streptococcal pharyngitis  J02.0    3. Acute abdominal pain  R10.9         Discharge Medications:  Discharge Medication List as of 12/10/2021  3:33 AM      START taking these medications    Details   cephALEXin (KEFLEX) 500 MG capsule Take 1 capsule (500 mg) by mouth 2 times daily for 10 days, Disp-20 capsule, R-0, Local Print      oseltamivir (TAMIFLU) 75 MG capsule Take 1 capsule (75 mg) by mouth 2 times daily for 5 days, Disp-10 capsule, R-0, Local Print             Scribe Disclosure:  Maciel GUPTA, am serving as a scribe at 1:39 AM on 12/10/2021 to document services personally performed by Speedy Rosales MD based on my observations and the provider's statements to me.              Speedy Rosales MD  12/10/21 0550

## 2021-12-29 ENCOUNTER — VIRTUAL VISIT (OUTPATIENT)
Dept: FAMILY MEDICINE | Facility: CLINIC | Age: 26
End: 2021-12-29
Payer: COMMERCIAL

## 2021-12-29 DIAGNOSIS — F41.9 ANXIETY: ICD-10-CM

## 2021-12-29 PROCEDURE — 99214 OFFICE O/P EST MOD 30 MIN: CPT | Mod: GT | Performed by: NURSE PRACTITIONER

## 2021-12-29 RX ORDER — SERTRALINE HYDROCHLORIDE 100 MG/1
100 TABLET, FILM COATED ORAL DAILY
Qty: 30 TABLET | Refills: 1 | Status: SHIPPED | OUTPATIENT
Start: 2021-12-29 | End: 2022-01-27

## 2021-12-29 RX ORDER — HYDROXYZINE HYDROCHLORIDE 25 MG/1
25 TABLET, FILM COATED ORAL 3 TIMES DAILY PRN
Qty: 30 TABLET | Refills: 1 | Status: SHIPPED | OUTPATIENT
Start: 2021-12-29 | End: 2023-08-02

## 2021-12-29 ASSESSMENT — PATIENT HEALTH QUESTIONNAIRE - PHQ9
SUM OF ALL RESPONSES TO PHQ QUESTIONS 1-9: 8
5. POOR APPETITE OR OVEREATING: MORE THAN HALF THE DAYS

## 2021-12-29 ASSESSMENT — ANXIETY QUESTIONNAIRES
7. FEELING AFRAID AS IF SOMETHING AWFUL MIGHT HAPPEN: NEARLY EVERY DAY
5. BEING SO RESTLESS THAT IT IS HARD TO SIT STILL: SEVERAL DAYS
1. FEELING NERVOUS, ANXIOUS, OR ON EDGE: NEARLY EVERY DAY
GAD7 TOTAL SCORE: 15
IF YOU CHECKED OFF ANY PROBLEMS ON THIS QUESTIONNAIRE, HOW DIFFICULT HAVE THESE PROBLEMS MADE IT FOR YOU TO DO YOUR WORK, TAKE CARE OF THINGS AT HOME, OR GET ALONG WITH OTHER PEOPLE: SOMEWHAT DIFFICULT
3. WORRYING TOO MUCH ABOUT DIFFERENT THINGS: NEARLY EVERY DAY
2. NOT BEING ABLE TO STOP OR CONTROL WORRYING: NEARLY EVERY DAY
6. BECOMING EASILY ANNOYED OR IRRITABLE: NOT AT ALL

## 2021-12-29 NOTE — PROGRESS NOTES
Deion is a 26 year old who is being evaluated via a billable video visit.      How would you like to obtain your AVS? MyChart  If the video visit is dropped, the invitation should be resent by: Text to cell phone: [phone]  Will anyone else be joining your video visit? No      Video Start Time: 1:00 pm    Assessment & Plan     Anxiety  Worsening.  Initially had great results with zoloft when started last summer.  Will try increasing dose.  I think he would benefit greatly from therapy and he is agreeable to this.  Can use prn hydroxyzine as needed for increased anxiety/panic.  Follow-up 4-6 weeks.   - Adult Mental Health Referral; Future  - sertraline (ZOLOFT) 100 MG tablet; Take 1 tablet (100 mg) by mouth daily  - Testosterone, total; Future  - hydrOXYzine (ATARAX) 25 MG tablet; Take 1 tablet (25 mg) by mouth 3 times daily as needed for anxiety          Return in about 4 weeks (around 1/26/2022) for Video Visit.    [facility]ILIP [facility]   Deion is a 26 year old who presents for the following health issues     HPI     Anxiety Follow-Up    How are you doing with your anxiety since your last visit? Worsened     Are you having other symptoms that might be associated with anxiety? Yes:  chest tightness and dry throat.  body tenses up.  patient can't relax.      Have you had a significant life event? Housing Concerns     Are you feeling depressed? No    Do you have any concerns with your use of alcohol or other drugs? No    Social History     Tobacco Use     Smoking status: Never Smoker     Smokeless tobacco: Never Used   Vaping Use     Vaping Use: Never used   Substance Use Topics     Alcohol use: Yes     Comment: wine or bourbon or beer- once a week, if not at all.     Drug use: No     CRISTOBAL-7 SCORE 4/2/2021 6/16/2021 12/29/2021   Total Score 12 1 15     PHQ 6/16/2021 12/29/2021   PHQ-9 Total Score 0 8   Q9: Thoughts of better off dead/self-harm past 2 weeks  Not at all Not at all     Last PHQ-9 12/29/2021   1.  Little interest or pleasure in doing things 1   2.  Feeling down, depressed, or hopeless 2   3.  Trouble falling or staying asleep, or sleeping too much 0   4.  Feeling tired or having little energy 1   5.  Poor appetite or overeating 0   6.  Feeling bad about yourself 3   7.  Trouble concentrating 0   8.  Moving slowly or restless 1   Q9: Thoughts of better off dead/self-harm past 2 weeks 0   PHQ-9 Total Score 8   Difficulty at work, home, or with people Very difficult     CRISTOBAL-7  12/29/2021   1. Feeling nervous, anxious, or on edge 3   2. Not being able to stop or control worrying 3   3. Worrying too much about different things 3   4. Trouble relaxing 2   5. Being so restless that it is hard to sit still 1   6. Becoming easily annoyed or irritable 0   7. Feeling afraid, as if something awful might happen 3   CRISTOBAL-7 Total Score 15   If you checked any problems, how difficult have they made it for you to do your work, take care of things at home, or get along with other people? Somewhat difficult   Anxiety has been worsening.   Lots of worry and fears.   Has a constant fear of getting sick and dying.   Feeling hopeless, impending doom.  Was renting a home with the intent to buy, but not able to buy due to market.  He and his family moved in with his parents.   Palpitations with anxiety--improve when he calms down.  Wondering if testosterone level could be an issue.  Not able to lift as much weight at the gym, mood swings.   No change in sex drive or fatigue.   No trouble sleeping; taking 2 mg of melatonin as needed at night.         How many servings of fruits and vegetables do you eat daily?  2-3    On average, how many sweetened beverages do you drink each day (Examples: soda, juice, sweet tea, etc.  Do NOT count diet or artificially sweetened beverages)?   0    How many days per week do you exercise enough to make your heart beat faster? 4    How many minutes a  "day do you exercise enough to make your heart beat faster? 60 or more  How many days per week do you miss taking your medication? 2    What makes it hard for you to take your medications?  remembering to take        Review of Systems   Constitutional, HEENT, cardiovascular, pulmonary, gi and gu systems are negative, except as otherwise noted.      Objective    Vitals - Patient Reported  Weight (Patient Reported): 97.5 kg (215 lb)  Height (Patient Reported): 180.3 cm (5' 11\")  BMI (Based on Pt Reported Ht/Wt): 29.99  Temperature (Patient Reported): 97.9  F (36.6  C) (oral)  Pulse (Patient Reported): 103  Pain Score: No Pain (0)      Vitals:  No vitals were obtained today due to virtual visit.    Physical Exam   GENERAL: Healthy, alert and no distress  EYES: Eyes grossly normal to inspection.  No discharge or erythema, or obvious scleral/conjunctival abnormalities.  RESP: No audible wheeze, cough, or visible cyanosis.  No increased work of breathing.    SKIN: Visible skin clear. No significant rash, abnormal pigmentation or lesions.  NEURO: Cranial nerves grossly intact.  Mentation and speech appropriate for age.  PSYCH: Mentation appears normal, affect normal/bright, tangential, judgement and insight intact, normal speech and appearance well-groomed.    No results found for this or any previous visit (from the past 24 hour(s)).            Video-Visit Details    Type of service:  Video Visit    Video End Time:1:23 PM    Originating Location (pt. Location): Home    Distant Location (provider location):  [facility]     Platform used for Video Visit: Amy  "

## 2021-12-30 ASSESSMENT — ANXIETY QUESTIONNAIRES: GAD7 TOTAL SCORE: 15

## 2022-01-03 ENCOUNTER — MYC MEDICAL ADVICE (OUTPATIENT)
Dept: FAMILY MEDICINE | Facility: CLINIC | Age: 27
End: 2022-01-03
Payer: COMMERCIAL

## 2022-01-04 ENCOUNTER — MYC MEDICAL ADVICE (OUTPATIENT)
Dept: FAMILY MEDICINE | Facility: CLINIC | Age: 27
End: 2022-01-04
Payer: COMMERCIAL

## 2022-01-04 NOTE — TELEPHONE ENCOUNTER
I'm not positive this is from the new dose of zoloft.  If he is wanting to continue on the new dose I think it is reasonable for him to try this again.  If hives recur stop.  If tolerates fine to continue.      Jodee Chou CNP

## 2022-01-04 NOTE — TELEPHONE ENCOUNTER
He said he wasn't anxious then said he was maybe a little anxious.     12/30 started taking #2 of his 50 mg capsules   Took 50 mg this morning without any issues.     Used Benadryl last night- subsided very fast. No mouth/lip swelling concerns. No breathing concerns.   This AM no sign of hives/redness     Adv I would reach out to [facility]- we are suppose to MC him back as he is in school     Adv lip/mouth swelling- emergent   Adv could be stress related, try not to focus on it, no googling, drink plenty of water, eat healthy.     Marni HILLMAN RN

## 2022-01-04 NOTE — TELEPHONE ENCOUNTER
Was he feeling particularly anxious when this happened?  Have the hives resolved?  What helped it resolve?  Time, antihistamine?     Jodee Chou CNP

## 2022-01-05 NOTE — TELEPHONE ENCOUNTER
He's been taking zoloft for quite some time, so I'm not convinced this is due to zoloft.  I do see he has an upcoming appt and we can discuss changing medications at that time.     Jodee Chou CNP

## 2022-01-06 NOTE — TELEPHONE ENCOUNTER
He mentions having a sinus infection.  Did someone put him on antibiotics that could be causing hives.      He can use hydroxyzine for increased anxiety.  This will also help hives.  Start daily zyrtec.      Jodee Chou CNP

## 2022-01-25 DIAGNOSIS — F41.9 ANXIETY: ICD-10-CM

## 2022-01-26 NOTE — TELEPHONE ENCOUNTER
Please call--was having hives and he decreased dose.  What dose is he currently taking?  How is anxiety going?   Please update phq/bubba.     Jodee Chou CNP

## 2022-01-26 NOTE — TELEPHONE ENCOUNTER
Routing refill request to provider for review/approval because:  PHQ-9 and CRISTOBAL-7 elevated.     PHQ 6/16/2021 12/29/2021   PHQ-9 Total Score 0 8   Q9: Thoughts of better off dead/self-harm past 2 weeks Not at all Not at all     CRISTOBAL-7 SCORE 4/2/2021 6/16/2021 12/29/2021   Total Score 12 1 15      Marisela Madrigal RN

## 2022-01-26 NOTE — TELEPHONE ENCOUNTER
PHQ 6/16/2021 12/29/2021 1/26/2022   PHQ-9 Total Score 0 8 1   Q9: Thoughts of better off dead/self-harm past 2 weeks Not at all Not at all Not at all     CRISTOBAL-7 SCORE 6/16/2021 12/29/2021 1/26/2022   Total Score - - 4 (minimal anxiety)   Total Score 1 15 4

## 2022-01-26 NOTE — TELEPHONE ENCOUNTER
"Response from patient via [employer]:    They are filled out. Although if I will be honest I think the choices are a little deceiving as I perceive \"Several Days\" as 3-4 and more than half as 5. In all reality the questions I answered \"several days\" should be in the 1-2 day range.      I will say that overall the increase in medication has helped a lot and the hydroxazine works well, almost too well to the point where even taking it 2 hours before bed, I wake up really groggy and don't get over that until the afternoon. I sleep VERY well with it, but like I said it works a little too well. I still take it as needed, which over the last few weeks I have probably only taken it about 3x.      The hives have not come back and I dont seem to be having issues. I am just really curious if some sort of viral infection caused them at the end. Because I did tell Andreia that I was having some sort of sinus issues during our video chat. Could have been covid, could have been a sinus infection, could have been a bad cold. I don't know at this point and I really dont care because I am feeling great and much happier.      Although, I will admit that I really dont want to depend on medication for the rest of my life and have been reading a book and doing some exercises that help as well. The book is called \"Calm the F*ck Down\" by Lana Mahajan and it is a book/ journal that focuses on your triggers with anxiety and how to deal with them. I think I have found my triggers which is medical stuff (potential issues that will probably never happen), medical shows, being sick, surgeries, etc etc. I cant watch shows like [place], ER when they deal with medical issues because I do feel myself get anxious with them. I can however watch like horror, zombie, sci fi flicks because I know that is fiction and medical stuff in those genres dont bother me. I think its the realism in some of these primetime TV shows that have a bit more realism " that trigger it.      Overall, I am doing much better and have become much more active, working harder at the gym, happier at work. I am not feeling as down any more and want to hang out with people more.     I am taking 100mg.

## 2022-01-27 RX ORDER — SERTRALINE HYDROCHLORIDE 100 MG/1
TABLET, FILM COATED ORAL
Qty: 90 TABLET | Refills: 1 | Status: SHIPPED | OUTPATIENT
Start: 2022-01-27 | End: 2022-08-16

## 2022-05-08 ENCOUNTER — HEALTH MAINTENANCE LETTER (OUTPATIENT)
Age: 27
End: 2022-05-08

## 2022-08-12 DIAGNOSIS — F41.9 ANXIETY: ICD-10-CM

## 2022-08-16 RX ORDER — SERTRALINE HYDROCHLORIDE 100 MG/1
TABLET, FILM COATED ORAL
Qty: 90 TABLET | Refills: 0 | Status: SHIPPED | OUTPATIENT
Start: 2022-08-16 | End: 2022-09-21

## 2022-08-16 NOTE — TELEPHONE ENCOUNTER
Medication is being filled for 1 time refill only due to:  Patient needs to be seen because it has been more than one year since last visit.     MA/TC - Please assist the pt is scheduling with Jodee Chou. Due for anxiety follow-up. Thanks!    Lana Harvey, RN, BSN, PHN  [facility]

## 2022-09-21 ENCOUNTER — OFFICE VISIT (OUTPATIENT)
Dept: FAMILY MEDICINE | Facility: CLINIC | Age: 27
End: 2022-09-21
Payer: COMMERCIAL

## 2022-09-21 VITALS
TEMPERATURE: 98.1 F | WEIGHT: 226.4 LBS | HEIGHT: 70 IN | RESPIRATION RATE: 16 BRPM | DIASTOLIC BLOOD PRESSURE: 74 MMHG | SYSTOLIC BLOOD PRESSURE: 124 MMHG | OXYGEN SATURATION: 98 % | HEART RATE: 77 BPM | BODY MASS INDEX: 32.41 KG/M2

## 2022-09-21 DIAGNOSIS — Z00.00 ROUTINE GENERAL MEDICAL EXAMINATION AT A HEALTH CARE FACILITY: Primary | ICD-10-CM

## 2022-09-21 DIAGNOSIS — F41.9 ANXIETY: ICD-10-CM

## 2022-09-21 PROCEDURE — 99395 PREV VISIT EST AGE 18-39: CPT | Performed by: NURSE PRACTITIONER

## 2022-09-21 RX ORDER — SERTRALINE HYDROCHLORIDE 100 MG/1
100 TABLET, FILM COATED ORAL DAILY
Qty: 90 TABLET | Refills: 3 | Status: SHIPPED | OUTPATIENT
Start: 2022-09-21 | End: 2023-12-01

## 2022-09-21 ASSESSMENT — ENCOUNTER SYMPTOMS
HEARTBURN: 0
PARESTHESIAS: 0
MYALGIAS: 0
HEMATOCHEZIA: 0
ABDOMINAL PAIN: 0
CONSTIPATION: 0
DIARRHEA: 0
NAUSEA: 0
DIZZINESS: 0
CHILLS: 0
FREQUENCY: 0
WEAKNESS: 0
JOINT SWELLING: 0
SHORTNESS OF BREATH: 0
COUGH: 0
SORE THROAT: 0
ARTHRALGIAS: 0
NERVOUS/ANXIOUS: 0
HEADACHES: 0
PALPITATIONS: 0
FEVER: 0
DYSURIA: 0
EYE PAIN: 0
HEMATURIA: 0

## 2022-09-21 ASSESSMENT — ANXIETY QUESTIONNAIRES
3. WORRYING TOO MUCH ABOUT DIFFERENT THINGS: NOT AT ALL
1. FEELING NERVOUS, ANXIOUS, OR ON EDGE: NOT AT ALL
GAD7 TOTAL SCORE: 1
6. BECOMING EASILY ANNOYED OR IRRITABLE: SEVERAL DAYS
5. BEING SO RESTLESS THAT IT IS HARD TO SIT STILL: NOT AT ALL
IF YOU CHECKED OFF ANY PROBLEMS ON THIS QUESTIONNAIRE, HOW DIFFICULT HAVE THESE PROBLEMS MADE IT FOR YOU TO DO YOUR WORK, TAKE CARE OF THINGS AT HOME, OR GET ALONG WITH OTHER PEOPLE: NOT DIFFICULT AT ALL
7. FEELING AFRAID AS IF SOMETHING AWFUL MIGHT HAPPEN: NOT AT ALL
2. NOT BEING ABLE TO STOP OR CONTROL WORRYING: NOT AT ALL
GAD7 TOTAL SCORE: 1

## 2022-09-21 ASSESSMENT — PAIN SCALES - GENERAL: PAINLEVEL: NO PAIN (0)

## 2022-09-21 ASSESSMENT — PATIENT HEALTH QUESTIONNAIRE - PHQ9
5. POOR APPETITE OR OVEREATING: NOT AT ALL
SUM OF ALL RESPONSES TO PHQ QUESTIONS 1-9: 0

## 2022-09-21 NOTE — PROGRESS NOTES
"SUBJECTIVE:   CC: Deion is an 26 year old who presents for preventative health visit.       Patient has been advised of split billing requirements and indicates understanding: Yes  Healthy Habits:     Getting at least 3 servings of Calcium per day:  Yes    Bi-annual eye exam:  NO    Dental care twice a year:  Yes    Sleep apnea or symptoms of sleep apnea:  None    Diet:  Other    Frequency of exercise:  4-5 days/week    Duration of exercise:  Greater than 60 minutes    Taking medications regularly:  Yes    Medication side effects:  None    PHQ-2 Total Score: 0    Additional concerns today:  Yes  1.  Left shoulder pain after exercise; improving.  Suspects this is related to overuse from lifting weights at the gym.  Pain in the front of the shoulder, good ROM. No numbness or tingling.      Anxiety: since increasing dose to 100 mg last winter feels like anxiety has been \"almost perfect\".  Bought a house and moved to [place].  Teaching middle school science.     CRISTOBAL-7 SCORE 6/16/2021 12/29/2021 1/26/2022   Total Score - - 4 (minimal anxiety)   Total Score 1 15 4         Today's PHQ-2 Score:   PHQ-2 ( 1999 Pfizer) 9/21/2022   Q1: Little interest or pleasure in doing things 0   Q2: Feeling down, depressed or hopeless 0   PHQ-2 Score 0   PHQ-2 Total Score (12-17 Years)- Positive if 3 or more points; Administer PHQ-A if positive -   Q1: Little interest or pleasure in doing things Not at all   Q2: Feeling down, depressed or hopeless Not at all   PHQ-2 Score 0       Abuse: Current or Past(Physical, Sexual or Emotional)- NO  Do you feel safe in your environment? YES        Social History     Tobacco Use     Smoking status: Never Smoker     Smokeless tobacco: Never Used   Substance Use Topics     Alcohol use: Yes     Comment: wine or bourbon or beer- once a week, if not at all.     If you drink alcohol do you typically have >3 drinks per day or >7 drinks per week? No    Alcohol Use 9/21/2022   Prescreen: >3 drinks/day or >7 " drinks/week? No   No flowsheet data found.    Last PSA: No results found for: PSA    Reviewed orders with patient. Reviewed health maintenance and updated orders accordingly - Yes  Labs reviewed in EPIC  BP Readings from Last 3 Encounters:   09/21/22 124/74   12/10/21 129/63   04/02/21 120/70    Wt Readings from Last 3 Encounters:   09/21/22 102.7 kg (226 lb 6.4 oz)   04/02/21 94.5 kg (208 lb 6.4 oz)   03/19/21 97.5 kg (215 lb)                  Current Outpatient Medications   Medication Sig Dispense Refill     sertraline (ZOLOFT) 100 MG tablet TAKE 1 TABLET BY MOUTH EVERY DAY 90 tablet 0     hydrOXYzine (ATARAX) 25 MG tablet Take 1 tablet (25 mg) by mouth 3 times daily as needed for anxiety (Patient not taking: Reported on 9/21/2022) 30 tablet 1     NO ACTIVE MEDICATIONS  (Patient not taking: No sig reported)       Allergies   Allergen Reactions     Augmentin        Reviewed and updated as needed this visit by clinical staff   Tobacco  Allergies  Meds   Med Hx  Surg Hx  Fam Hx  Soc Hx          Reviewed and updated as needed this visit by Provider                       Review of Systems   Constitutional: Negative for chills and fever.   HENT: Negative for congestion, ear pain, hearing loss and sore throat.    Eyes: Negative for pain and visual disturbance.   Respiratory: Negative for cough and shortness of breath.    Cardiovascular: Negative for chest pain, palpitations and peripheral edema.   Gastrointestinal: Negative for abdominal pain, constipation, diarrhea, heartburn, hematochezia and nausea.   Genitourinary: Negative for dysuria, frequency, genital sores, hematuria, impotence, penile discharge and urgency.   Musculoskeletal: Negative for arthralgias, joint swelling and myalgias.   Skin: Negative for rash.   Neurological: Negative for dizziness, weakness, headaches and paresthesias.   Psychiatric/Behavioral: Negative for mood changes. The patient is not nervous/anxious.      OBJECTIVE:   /74 (BP  "Location: Right arm, Patient Position: Sitting, Cuff Size: Adult Large)   Pulse 77   Temp 98.1  F (36.7  C) (Oral)   Resp 16   Ht 1.772 m (5' 9.75\")   Wt 102.7 kg (226 lb 6.4 oz)   SpO2 98%   BMI 32.72 kg/m      Physical Exam  GENERAL: healthy, alert and no distress  EYES: Eyes grossly normal to inspection, PERRL and conjunctivae and sclerae normal  HENT: ear canals and TM's normal, nose and mouth without ulcers or lesions  NECK: no adenopathy, no asymmetry, masses, or scars and thyroid normal to palpation  RESP: lungs clear to auscultation - no rales, rhonchi or wheezes  CV: regular rate and rhythm, normal S1 S2, no S3 or S4, no murmur, click or rub, no peripheral edema and peripheral pulses strong  ABDOMEN: soft, nontender, no hepatosplenomegaly, no masses and bowel sounds normal  MS: no gross musculoskeletal defects noted, no edema, L shoulder: mild anterior tenderness, neg impingement tests  SKIN: no suspicious lesions or rashes  NEURO: Normal strength and tone, mentation intact and speech normal  PSYCH: mentation appears normal, affect normal/bright    Diagnostic Test Results:  Labs reviewed in Epic    ASSESSMENT/PLAN:   1. Routine general medical examination at a health care facility  reviewed age appropriate screenings and immunizations.  Declines vaccines today.     2. Anxiety  Chronic, stable.  Continue zoloft.   - sertraline (ZOLOFT) 100 MG tablet; Take 1 tablet (100 mg) by mouth daily  Dispense: 90 tablet; Refill: 3      COUNSELING:   Reviewed preventive health counseling, as reflected in patient instructions       Regular exercise       Healthy diet/nutrition    Estimated body mass index is 32.72 kg/m  as calculated from the following:    Height as of this encounter: 1.772 m (5' 9.75\").    Weight as of this encounter: 102.7 kg (226 lb 6.4 oz).     Weight management plan: Discussed healthy diet and exercise guidelines    He reports that he has never smoked. He has never used smokeless " tobacco.      Counseling Resources:  ATP IV Guidelines  Pooled Cohorts Equation Calculator  FRAX Risk Assessment  ICSI Preventive Guidelines  Dietary Guidelines for Americans, 2010  USDA's MyPlate  ASA Prophylaxis  Lung CA Screening    Jodee Chou, PHILIP [facility]

## 2022-09-21 NOTE — NURSING NOTE
"Chief Complaint   Patient presents with     Physical     Blood Draw     Fasting labs.       Initial /74 (BP Location: Right arm, Patient Position: Sitting, Cuff Size: Adult Large)   Pulse 77   Temp 98.1  F (36.7  C) (Oral)   Resp 16   Ht 1.772 m (5' 9.75\")   Wt 102.7 kg (226 lb 6.4 oz)   SpO2 98%   BMI 32.72 kg/m   Estimated body mass index is 32.72 kg/m  as calculated from the following:    Height as of this encounter: 1.772 m (5' 9.75\").    Weight as of this encounter: 102.7 kg (226 lb 6.4 oz).  BP completed using cuff size large right arm    Lisa Magill, CMA    "

## 2022-09-27 ENCOUNTER — MYC MEDICAL ADVICE (OUTPATIENT)
Dept: FAMILY MEDICINE | Facility: CLINIC | Age: 27
End: 2022-09-27

## 2023-01-14 ENCOUNTER — HEALTH MAINTENANCE LETTER (OUTPATIENT)
Age: 28
End: 2023-01-14

## 2023-02-20 ENCOUNTER — MYC MEDICAL ADVICE (OUTPATIENT)
Dept: FAMILY MEDICINE | Facility: CLINIC | Age: 28
End: 2023-02-20

## 2023-04-03 ENCOUNTER — MYC MEDICAL ADVICE (OUTPATIENT)
Dept: FAMILY MEDICINE | Facility: CLINIC | Age: 28
End: 2023-04-03
Payer: COMMERCIAL

## 2023-04-03 DIAGNOSIS — F41.9 ANXIETY: Primary | ICD-10-CM

## 2023-06-27 ENCOUNTER — VIRTUAL VISIT (OUTPATIENT)
Dept: URGENT CARE | Facility: CLINIC | Age: 28
End: 2023-06-27
Payer: COMMERCIAL

## 2023-06-27 DIAGNOSIS — J32.9 OTHER SINUSITIS, UNSPECIFIED CHRONICITY: Primary | ICD-10-CM

## 2023-06-27 PROCEDURE — 99213 OFFICE O/P EST LOW 20 MIN: CPT | Mod: 95 | Performed by: EMERGENCY MEDICINE

## 2023-06-27 RX ORDER — AZITHROMYCIN 250 MG/1
TABLET, FILM COATED ORAL
Qty: 6 TABLET | Refills: 0 | Status: SHIPPED | OUTPATIENT
Start: 2023-06-27 | End: 2023-07-02

## 2023-06-27 NOTE — PROGRESS NOTES
Video visit:  Start time: 11:03 AM  Stop time: 11:09 AM  Duration: 6 minutes  Patient location: At work  Provider location:  [employer] [place] virtual provider (remote).  [facility]          CHIEF COMPLAINT: Possible sinus infection.      HPI: Patient is a 27-year-old male whose been ill for greater than 1 week with congestion, yellowish rhinorrhea and now increasing mid facial pain.      ROS: See HPI otherwise normal.    Allergies   Allergen Reactions     Amoxicillin-Pot Clavulanate       Current Outpatient Medications   Medication Sig Dispense Refill     azithromycin (ZITHROMAX) 250 MG tablet Take 2 tablets (500 mg) by mouth daily for 1 day, THEN 1 tablet (250 mg) daily for 4 days. 6 tablet 0     hydrOXYzine (ATARAX) 25 MG tablet Take 1 tablet (25 mg) by mouth 3 times daily as needed for anxiety (Patient not taking: Reported on 9/21/2022) 30 tablet 1     sertraline (ZOLOFT) 100 MG tablet Take 1 tablet (100 mg) by mouth daily 90 tablet 3         PE: No acute distress on video visit.  Alert and oriented.  No dysphonia.  Nondyspneic appearing speaking in full sentences.        TREATMENT: None.      ASSESSMENT: Symptoms consistent with bacterial sinusitis      DIAGNOSIS: Acute sinusitis      PLAN: Z-Trino ordered.  Follow-up 1 week if symptoms persist

## 2023-06-28 ENCOUNTER — MYC MEDICAL ADVICE (OUTPATIENT)
Dept: FAMILY MEDICINE | Facility: CLINIC | Age: 28
End: 2023-06-28
Payer: COMMERCIAL

## 2023-07-31 ENCOUNTER — MYC MEDICAL ADVICE (OUTPATIENT)
Dept: FAMILY MEDICINE | Facility: CLINIC | Age: 28
End: 2023-07-31
Payer: COMMERCIAL

## 2023-08-01 ENCOUNTER — MYC MEDICAL ADVICE (OUTPATIENT)
Dept: FAMILY MEDICINE | Facility: CLINIC | Age: 28
End: 2023-08-01
Payer: COMMERCIAL

## 2023-08-02 ENCOUNTER — OFFICE VISIT (OUTPATIENT)
Dept: FAMILY MEDICINE | Facility: CLINIC | Age: 28
End: 2023-08-02
Payer: COMMERCIAL

## 2023-08-02 ENCOUNTER — TELEPHONE (OUTPATIENT)
Dept: GASTROENTEROLOGY | Facility: CLINIC | Age: 28
End: 2023-08-02

## 2023-08-02 VITALS
HEIGHT: 70 IN | TEMPERATURE: 98 F | SYSTOLIC BLOOD PRESSURE: 130 MMHG | HEART RATE: 80 BPM | RESPIRATION RATE: 20 BRPM | WEIGHT: 221.9 LBS | BODY MASS INDEX: 31.77 KG/M2 | DIASTOLIC BLOOD PRESSURE: 80 MMHG | OXYGEN SATURATION: 100 %

## 2023-08-02 DIAGNOSIS — Z83.79 FAMILY HISTORY OF ULCERATIVE COLITIS: ICD-10-CM

## 2023-08-02 DIAGNOSIS — K62.5 RECTAL BLEEDING: Primary | ICD-10-CM

## 2023-08-02 DIAGNOSIS — Z80.0 FAMILY HISTORY OF COLON CANCER: ICD-10-CM

## 2023-08-02 DIAGNOSIS — K64.4 EXTERNAL HEMORRHOIDS: ICD-10-CM

## 2023-08-02 DIAGNOSIS — F41.1 GAD (GENERALIZED ANXIETY DISORDER): ICD-10-CM

## 2023-08-02 DIAGNOSIS — R03.0 ELEVATED BLOOD PRESSURE READING WITHOUT DIAGNOSIS OF HYPERTENSION: ICD-10-CM

## 2023-08-02 LAB
ALBUMIN SERPL BCG-MCNC: 5.1 G/DL (ref 3.5–5.2)
ALP SERPL-CCNC: 64 U/L (ref 40–129)
ALT SERPL W P-5'-P-CCNC: 16 U/L (ref 0–70)
ANION GAP SERPL CALCULATED.3IONS-SCNC: 12 MMOL/L (ref 7–15)
AST SERPL W P-5'-P-CCNC: 24 U/L (ref 0–45)
BILIRUB SERPL-MCNC: 0.7 MG/DL
BUN SERPL-MCNC: 11.8 MG/DL (ref 6–20)
CALCIUM SERPL-MCNC: 10.2 MG/DL (ref 8.6–10)
CHLORIDE SERPL-SCNC: 103 MMOL/L (ref 98–107)
CREAT SERPL-MCNC: 0.93 MG/DL (ref 0.67–1.17)
DEPRECATED HCO3 PLAS-SCNC: 25 MMOL/L (ref 22–29)
ERYTHROCYTE [DISTWIDTH] IN BLOOD BY AUTOMATED COUNT: 11.7 % (ref 10–15)
GFR SERPL CREATININE-BSD FRML MDRD: >90 ML/MIN/1.73M2
GLUCOSE SERPL-MCNC: 96 MG/DL (ref 70–99)
HCT VFR BLD AUTO: 45.2 % (ref 40–53)
HGB BLD-MCNC: 16.1 G/DL (ref 13.3–17.7)
MCH RBC QN AUTO: 29.2 PG (ref 26.5–33)
MCHC RBC AUTO-ENTMCNC: 35.6 G/DL (ref 31.5–36.5)
MCV RBC AUTO: 82 FL (ref 78–100)
PLATELET # BLD AUTO: 249 10E3/UL (ref 150–450)
POTASSIUM SERPL-SCNC: 4 MMOL/L (ref 3.4–5.3)
PROT SERPL-MCNC: 7.7 G/DL (ref 6.4–8.3)
RBC # BLD AUTO: 5.51 10E6/UL (ref 4.4–5.9)
SODIUM SERPL-SCNC: 140 MMOL/L (ref 136–145)
TSH SERPL DL<=0.005 MIU/L-ACNC: 1.34 UIU/ML (ref 0.3–4.2)
WBC # BLD AUTO: 4.6 10E3/UL (ref 4–11)

## 2023-08-02 PROCEDURE — 80053 COMPREHEN METABOLIC PANEL: CPT | Performed by: FAMILY MEDICINE

## 2023-08-02 PROCEDURE — 36415 COLL VENOUS BLD VENIPUNCTURE: CPT | Performed by: FAMILY MEDICINE

## 2023-08-02 PROCEDURE — 85027 COMPLETE CBC AUTOMATED: CPT | Performed by: FAMILY MEDICINE

## 2023-08-02 PROCEDURE — 99214 OFFICE O/P EST MOD 30 MIN: CPT | Performed by: FAMILY MEDICINE

## 2023-08-02 PROCEDURE — 84443 ASSAY THYROID STIM HORMONE: CPT | Performed by: FAMILY MEDICINE

## 2023-08-02 RX ORDER — HYDROCORTISONE ACETATE 25 MG/1
25 SUPPOSITORY RECTAL 2 TIMES DAILY
Qty: 14 SUPPOSITORY | Refills: 3 | Status: SHIPPED | OUTPATIENT
Start: 2023-08-02 | End: 2023-09-19

## 2023-08-02 NOTE — PROGRESS NOTES
"  Assessment & Plan     Rectal bleeding  Suspect internal and external hemorrhoids  - Adult GI  Referral - Procedure Only; Future    External hemorrhoids  Due to Fhx and anxiety, will place referral  - Adult GI  Referral - Procedure Only; Future  - hydrocortisone (ANUSOL-HC) 25 MG suppository; Place 1 suppository (25 mg) rectally 2 times daily    Family history of ulcerative colitis  See above  - Adult GI  Referral - Procedure Only; Future    Family history of colon cancer  See above  - Adult GI  Referral - Procedure Only; Future    Elevated blood pressure reading without diagnosis of hypertension  Recheck once monthly or so, checking labs, discussed diet, weight and exercise  - CBC with platelets; Future  - TSH with free T4 reflex; Future  - Comprehensive metabolic panel (BMP + Alb, Alk Phos, ALT, AST, Total. Bili, TP); Future  - CBC with platelets  - TSH with free T4 reflex  - Comprehensive metabolic panel (BMP + Alb, Alk Phos, ALT, AST, Total. Bili, TP)    CRISTOBAL (generalized anxiety disorder)  Control is fair, cont same, Follow up with pcp to discuss more, has appt    Declined all vaccines    Ordering of each unique test  Prescription drug management         BMI:   Estimated body mass index is 31.84 kg/m  as calculated from the following:    Height as of this encounter: 1.778 m (5' 10\").    Weight as of this encounter: 100.7 kg (221 lb 14.4 oz).   Weight management plan: Discussed healthy diet and exercise guidelines    Work on weight loss  Regular exercise    Antonella Blair MD  [facility] [place]   Deion is a 27 year old, presenting for the following health issues:  Rectal Problem        8/2/2023     8:49 AM   Additional Questions   Roomed by Holly Cain       History of Present Illness       Reason for visit:  Anal fissure  Symptom onset:  1-3 days ago  Symptoms include:  Just small amount of blood when wiping NOT in stool  Symptom " "intensity:  Mild  Symptom progression:  Staying the same  Had these symptoms before:  Yes  Has tried/received treatment for these symptoms:  No  What makes it worse:  Cheese grater toilet paper  What makes it better:  My Dude Wipes    He eats 4 or more servings of fruits and vegetables daily.He consumes 0 sweetened beverage(s) daily.He exercises with enough effort to increase his heart rate 60 or more minutes per day.  He exercises with enough effort to increase his heart rate 4 days per week.   He is taking medications regularly.  Not burning or painful, but feels rough toilet makes it worse.  Goes daily, BM mostly all in one piece, not dripping, normal color to the stool.  PGF-colon cancer, but his dad is fine.     Takes his zoloft daily   Taking magnesium nightly and works out 4 times per week, not high salt, eating protein and veggies, and fruit and veggies powdered drink.  Dad has HTN-not sure it is treated.  Limiting caffeine.             Review of Systems   CONSTITUTIONAL: NEGATIVE for fever, chills, change in weight  ENT/MOUTH: NEGATIVE for ear, mouth and throat problems  RESP: NEGATIVE for significant cough or SOB  CV: NEGATIVE for chest pain, palpitations or peripheral edema      Objective    /80 (BP Location: Right arm, Patient Position: Sitting, Cuff Size: Adult Large)   Pulse 80   Temp 98  F (36.7  C) (Oral)   Resp 20   Ht 1.778 m (5' 10\")   Wt 100.7 kg (221 lb 14.4 oz)   SpO2 100%   BMI 31.84 kg/m    Body mass index is 31.84 kg/m .  Physical Exam   GENERAL: healthy, alert and no distress  RESP: lungs clear to auscultation - no rales, rhonchi or wheezes  CV: regular rate and rhythm, normal S1 S2, no S3 or S4, no murmur, click or rub, no peripheral edema and peripheral pulses strong  ABDOMEN: soft, nontender, no hepatosplenomegaly, no masses and bowel sounds normal  RECTAL: normal sphincter tone, no rectal masses and external, several hemorrhoids  MS: no gross musculoskeletal defects noted, " no edema  SKIN: no suspicious lesions or rashes  NEURO: Normal strength and tone, mentation intact and speech normal  PSYCH: mentation appears normal, affect normal/bright    Admission on 12/10/2021, Discharged on 12/10/2021   Component Date Value Ref Range Status    Sodium 12/10/2021 137  133 - 144 mmol/L Final    Potassium 12/10/2021 3.4  3.4 - 5.3 mmol/L Final    Chloride 12/10/2021 106  94 - 109 mmol/L Final    Carbon Dioxide (CO2) 12/10/2021 23  20 - 32 mmol/L Final    Anion Gap 12/10/2021 8  3 - 14 mmol/L Final    Urea Nitrogen 12/10/2021 11  7 - 30 mg/dL Final    Creatinine 12/10/2021 0.94  0.66 - 1.25 mg/dL Final    Calcium 12/10/2021 9.4  8.5 - 10.1 mg/dL Final    Glucose 12/10/2021 125 (H)  70 - 99 mg/dL Final    GFR Estimate 12/10/2021 >90  >60 mL/min/1.73m2 Final    As of July 11, 2021, eGFR is calculated by the CKD-EPI creatinine equation, without race adjustment. eGFR can be influenced by muscle mass, exercise, and diet. The reported eGFR is an estimation only and is only applicable if the renal function is stable.    WBC Count 12/10/2021 8.6  4.0 - 11.0 10e3/uL Final    RBC Count 12/10/2021 5.06  4.40 - 5.90 10e6/uL Final    Hemoglobin 12/10/2021 14.8  13.3 - 17.7 g/dL Final    Hematocrit 12/10/2021 42.6  40.0 - 53.0 % Final    MCV 12/10/2021 84  78 - 100 fL Final    MCH 12/10/2021 29.2  26.5 - 33.0 pg Final    MCHC 12/10/2021 34.7  31.5 - 36.5 g/dL Final    RDW 12/10/2021 11.8  10.0 - 15.0 % Final    Platelet Count 12/10/2021 250  150 - 450 10e3/uL Final    % Neutrophils 12/10/2021 86  % Final    % Lymphocytes 12/10/2021 6  % Final    % Monocytes 12/10/2021 7  % Final    % Eosinophils 12/10/2021 0  % Final    % Basophils 12/10/2021 1  % Final    % Immature Granulocytes 12/10/2021 0  % Final    NRBCs per 100 WBC 12/10/2021 0  <1 /100 Final    Absolute Neutrophils 12/10/2021 7.3  1.6 - 8.3 10e3/uL Final    Absolute Lymphocytes 12/10/2021 0.6 (L)  0.8 - 5.3 10e3/uL Final    Absolute Monocytes 12/10/2021  0.6  0.0 - 1.3 10e3/uL Final    Absolute Eosinophils 12/10/2021 0.0  0.0 - 0.7 10e3/uL Final    Absolute Basophils 12/10/2021 0.0  0.0 - 0.2 10e3/uL Final    Absolute Immature Granulocytes 12/10/2021 0.0  <=0.4 10e3/uL Final    Absolute NRBCs 12/10/2021 0.0  10e3/uL Final    Influenza A PCR 12/10/2021 Positive (A)  Negative Final    Influenza B PCR 12/10/2021 Negative  Negative Final    SARS CoV2 PCR 12/10/2021 Negative  Negative Final    NEGATIVE: SARS-CoV-2 (COVID-19) RNA not detected, presumed negative.    Group A Strep antigen 12/10/2021 Positive (A)  Negative Final

## 2023-08-02 NOTE — TELEPHONE ENCOUNTER
"Endoscopy Scheduling Screen    Have you had a positive Covid test in the last 14 days?  No    Are you active on MyChart?   Yes    What insurance is in the chart?  BC/BS: Schedule in [facility] unless patient meets exclusion criteria.     Ordering/Referring Provider:     Antonella Blair MD      (If ordering provider performs procedure, schedule with ordering provider unless otherwise instructed. )    BMI: Estimated body mass index is 31.84 kg/m  as calculated from the following:    Height as of an earlier encounter on 8/2/23: 1.778 m (5' 10\").    Weight as of an earlier encounter on 8/2/23: 100.7 kg (221 lb 14.4 oz).     Sedation Ordered  moderate sedation.   If patient BMI > 50 do not schedule in [facility].    Are you taking any prescription medications for pain?   No    Are you taking methadone or Suboxone?  No    Do you have a history of malignant hyperthermia or adverse reaction to anesthesia?  No    (Females) Are you currently pregnant?   No     Have you been diagnosed or told you have pulmonary hypertension?   No    Do you have an LVAD?  No    Have you been told you have moderate to severe sleep apnea?  No    Have you been told you have COPD, asthma, or any other lung disease?  No    Do you have any heart conditions?  No     Have you ever had or are you awaiting a heart or lung transplant?   No    Have you had a stroke or transient ischemic attack (TIA aka \"mini stroke\" in the last 6 months?   No    Have you been diagnosed with or been told you have cirrhosis of the liver?   No    Are you currently on dialysis?   No    Do you need assistance transferring?   No    BMI: Estimated body mass index is 31.84 kg/m  as calculated from the following:    Height as of an earlier encounter on 8/2/23: 1.778 m (5' 10\").    Weight as of an earlier encounter on 8/2/23: 100.7 kg (221 lb 14.4 oz).     Is patients BMI > 40 and scheduling location [facility]?  No    Do you take the medication Phentermine, Ozempic or Wegovy?  No    Do you take " the medication Naltrexone?  No    Do you take blood thinners?  No      Prep   Are you currently on dialysis or do you have chronic kidney disease?  No    Do you have a diagnosis of diabetes?  No    Do you have a diagnosis of cystic fibrosis (CF)?  No    On a regular basis do you go 3 -5 days between bowel movements?  No    BMI > 40?  No    Preferred Pharmacy:    [facility] #[id number] - [place], [place] - [address] [address]  [address] [address] [address]  Phone: [phone] Fax: [phone]      Final Scheduling Details   Colonoscopy prep sent?  MiraLAX (No Mag Citrate)    Procedure scheduled  Colonoscopy    Surgeon:  SHERMAN     Date of procedure:  10/26/2023     Schedule PAC:   No    Location  [facility]    Sedation   Moderate Sedation    Patient Reminders:   You will receive a call from a Nurse to review instructions and health history.  This assessment must be completed prior to your procedure.  Failure to complete the Nurse assessment may result in the procedure being cancelled.      On the day of your procedure, please designate an adult(s) who can drive you home stay with you for the next 24 hours. The medicines used in the exam will make you sleepy. You will not be able to drive.      You cannot take public transportation, ride share services, or non-medical taxi service without a responsible caregiver.  Medical transport services are allowed with the requirement that a responsible caregiver will receive you at your destination.  We require that drivers and caregivers are confirmed prior to your procedure.

## 2023-08-02 NOTE — TELEPHONE ENCOUNTER
Received call from patient. Patient is concerned about possible tear near anus, does not believe blood is mixed in with stool, only on surface of skin. Scheduled for OV 8/2/23 for evaluation.     Carroll CARRENO RN 8/2/2023 at 8:21 AM

## 2023-09-19 ENCOUNTER — APPOINTMENT (OUTPATIENT)
Dept: GENERAL RADIOLOGY | Facility: CLINIC | Age: 28
End: 2023-09-19
Attending: STUDENT IN AN ORGANIZED HEALTH CARE EDUCATION/TRAINING PROGRAM
Payer: COMMERCIAL

## 2023-09-19 ENCOUNTER — HOSPITAL ENCOUNTER (INPATIENT)
Facility: CLINIC | Age: 28
LOS: 1 days | Discharge: HOME | End: 2023-09-20
Attending: EMERGENCY MEDICINE | Admitting: INTERNAL MEDICINE
Payer: COMMERCIAL

## 2023-09-19 ENCOUNTER — APPOINTMENT (OUTPATIENT)
Dept: CT IMAGING | Facility: CLINIC | Age: 28
End: 2023-09-19
Attending: EMERGENCY MEDICINE
Payer: COMMERCIAL

## 2023-09-19 DIAGNOSIS — K56.609 SBO (SMALL BOWEL OBSTRUCTION) (H): ICD-10-CM

## 2023-09-19 LAB
ALBUMIN SERPL BCG-MCNC: 5 G/DL (ref 3.5–5.2)
ALP SERPL-CCNC: 67 U/L (ref 40–129)
ALT SERPL W P-5'-P-CCNC: 20 U/L (ref 0–70)
ANION GAP SERPL CALCULATED.3IONS-SCNC: 14 MMOL/L (ref 7–15)
AST SERPL W P-5'-P-CCNC: 21 U/L (ref 0–45)
BASOPHILS # BLD AUTO: 0 10E3/UL (ref 0–0.2)
BASOPHILS NFR BLD AUTO: 0 %
BILIRUB SERPL-MCNC: 0.9 MG/DL
BUN SERPL-MCNC: 10.8 MG/DL (ref 6–20)
CALCIUM SERPL-MCNC: 10.3 MG/DL (ref 8.6–10)
CHLORIDE SERPL-SCNC: 102 MMOL/L (ref 98–107)
CREAT SERPL-MCNC: 0.91 MG/DL (ref 0.67–1.17)
DEPRECATED HCO3 PLAS-SCNC: 24 MMOL/L (ref 22–29)
EGFRCR SERPLBLD CKD-EPI 2021: >90 ML/MIN/1.73M2
EOSINOPHIL # BLD AUTO: 0 10E3/UL (ref 0–0.7)
EOSINOPHIL NFR BLD AUTO: 0 %
ERYTHROCYTE [DISTWIDTH] IN BLOOD BY AUTOMATED COUNT: 11.9 % (ref 10–15)
FLUAV RNA SPEC QL NAA+PROBE: NEGATIVE
FLUBV RNA RESP QL NAA+PROBE: NEGATIVE
GLUCOSE SERPL-MCNC: 131 MG/DL (ref 70–99)
HCT VFR BLD AUTO: 43.7 % (ref 40–53)
HGB BLD-MCNC: 16 G/DL (ref 13.3–17.7)
IMM GRANULOCYTES # BLD: 0.1 10E3/UL
IMM GRANULOCYTES NFR BLD: 0 %
LIPASE SERPL-CCNC: 14 U/L (ref 13–60)
LYMPHOCYTES # BLD AUTO: 0.7 10E3/UL (ref 0.8–5.3)
LYMPHOCYTES NFR BLD AUTO: 5 %
MCH RBC QN AUTO: 29.9 PG (ref 26.5–33)
MCHC RBC AUTO-ENTMCNC: 36.6 G/DL (ref 31.5–36.5)
MCV RBC AUTO: 82 FL (ref 78–100)
MONOCYTES # BLD AUTO: 0.5 10E3/UL (ref 0–1.3)
MONOCYTES NFR BLD AUTO: 4 %
NEUTROPHILS # BLD AUTO: 11.3 10E3/UL (ref 1.6–8.3)
NEUTROPHILS NFR BLD AUTO: 91 %
NRBC # BLD AUTO: 0 10E3/UL
NRBC BLD AUTO-RTO: 0 /100
PLATELET # BLD AUTO: 274 10E3/UL (ref 150–450)
POTASSIUM SERPL-SCNC: 3.6 MMOL/L (ref 3.4–5.3)
PROT SERPL-MCNC: 7.8 G/DL (ref 6.4–8.3)
RBC # BLD AUTO: 5.35 10E6/UL (ref 4.4–5.9)
RSV RNA SPEC NAA+PROBE: NEGATIVE
SARS-COV-2 RNA RESP QL NAA+PROBE: NEGATIVE
SODIUM SERPL-SCNC: 140 MMOL/L (ref 136–145)
WBC # BLD AUTO: 12.6 10E3/UL (ref 4–11)

## 2023-09-19 PROCEDURE — 99221 1ST HOSP IP/OBS SF/LOW 40: CPT | Performed by: STUDENT IN AN ORGANIZED HEALTH CARE EDUCATION/TRAINING PROGRAM

## 2023-09-19 PROCEDURE — 99222 1ST HOSP IP/OBS MODERATE 55: CPT | Performed by: INTERNAL MEDICINE

## 2023-09-19 PROCEDURE — 85025 COMPLETE CBC W/AUTO DIFF WBC: CPT | Performed by: EMERGENCY MEDICINE

## 2023-09-19 PROCEDURE — 83690 ASSAY OF LIPASE: CPT | Performed by: EMERGENCY MEDICINE

## 2023-09-19 PROCEDURE — 74018 RADEX ABDOMEN 1 VIEW: CPT

## 2023-09-19 PROCEDURE — 258N000003 HC RX IP 258 OP 636: Performed by: EMERGENCY MEDICINE

## 2023-09-19 PROCEDURE — 250N000013 HC RX MED GY IP 250 OP 250 PS 637: Performed by: INTERNAL MEDICINE

## 2023-09-19 PROCEDURE — 250N000011 HC RX IP 250 OP 636: Mod: JZ | Performed by: EMERGENCY MEDICINE

## 2023-09-19 PROCEDURE — 99285 EMERGENCY DEPT VISIT HI MDM: CPT | Mod: 25

## 2023-09-19 PROCEDURE — 74177 CT ABD & PELVIS W/CONTRAST: CPT

## 2023-09-19 PROCEDURE — 120N000001 HC R&B MED SURG/OB

## 2023-09-19 PROCEDURE — 96361 HYDRATE IV INFUSION ADD-ON: CPT

## 2023-09-19 PROCEDURE — 96375 TX/PRO/DX INJ NEW DRUG ADDON: CPT

## 2023-09-19 PROCEDURE — 87637 SARSCOV2&INF A&B&RSV AMP PRB: CPT | Performed by: EMERGENCY MEDICINE

## 2023-09-19 PROCEDURE — 36415 COLL VENOUS BLD VENIPUNCTURE: CPT | Performed by: EMERGENCY MEDICINE

## 2023-09-19 PROCEDURE — 250N000011 HC RX IP 250 OP 636: Performed by: EMERGENCY MEDICINE

## 2023-09-19 PROCEDURE — 258N000003 HC RX IP 258 OP 636: Performed by: INTERNAL MEDICINE

## 2023-09-19 PROCEDURE — 80053 COMPREHEN METABOLIC PANEL: CPT | Performed by: EMERGENCY MEDICINE

## 2023-09-19 PROCEDURE — 96374 THER/PROPH/DIAG INJ IV PUSH: CPT | Mod: 59

## 2023-09-19 RX ORDER — NALOXONE HYDROCHLORIDE 0.4 MG/ML
0.2 INJECTION, SOLUTION INTRAMUSCULAR; INTRAVENOUS; SUBCUTANEOUS
Status: DISCONTINUED | OUTPATIENT
Start: 2023-09-19 | End: 2023-09-20 | Stop reason: HOSPADM

## 2023-09-19 RX ORDER — IOPAMIDOL 755 MG/ML
500 INJECTION, SOLUTION INTRAVASCULAR ONCE
Status: COMPLETED | OUTPATIENT
Start: 2023-09-19 | End: 2023-09-19

## 2023-09-19 RX ORDER — ONDANSETRON 2 MG/ML
4 INJECTION INTRAMUSCULAR; INTRAVENOUS EVERY 6 HOURS PRN
Status: DISCONTINUED | OUTPATIENT
Start: 2023-09-19 | End: 2023-09-20 | Stop reason: HOSPADM

## 2023-09-19 RX ORDER — SERTRALINE HYDROCHLORIDE 100 MG/1
100 TABLET, FILM COATED ORAL DAILY
Status: DISCONTINUED | OUTPATIENT
Start: 2023-09-19 | End: 2023-09-20 | Stop reason: HOSPADM

## 2023-09-19 RX ORDER — MORPHINE SULFATE 2 MG/ML
2 INJECTION, SOLUTION INTRAMUSCULAR; INTRAVENOUS
Status: DISCONTINUED | OUTPATIENT
Start: 2023-09-19 | End: 2023-09-20 | Stop reason: HOSPADM

## 2023-09-19 RX ORDER — MAGNESIUM HYDROXIDE/ALUMINUM HYDROXICE/SIMETHICONE 120; 1200; 1200 MG/30ML; MG/30ML; MG/30ML
30 SUSPENSION ORAL EVERY 4 HOURS PRN
Status: DISCONTINUED | OUTPATIENT
Start: 2023-09-19 | End: 2023-09-20 | Stop reason: HOSPADM

## 2023-09-19 RX ORDER — OXYCODONE HYDROCHLORIDE 5 MG/1
5 TABLET ORAL EVERY 4 HOURS PRN
Status: DISCONTINUED | OUTPATIENT
Start: 2023-09-19 | End: 2023-09-20 | Stop reason: HOSPADM

## 2023-09-19 RX ORDER — MORPHINE SULFATE 2 MG/ML
1 INJECTION, SOLUTION INTRAMUSCULAR; INTRAVENOUS
Status: DISCONTINUED | OUTPATIENT
Start: 2023-09-19 | End: 2023-09-20 | Stop reason: HOSPADM

## 2023-09-19 RX ORDER — NALOXONE HYDROCHLORIDE 0.4 MG/ML
0.4 INJECTION, SOLUTION INTRAMUSCULAR; INTRAVENOUS; SUBCUTANEOUS
Status: DISCONTINUED | OUTPATIENT
Start: 2023-09-19 | End: 2023-09-20 | Stop reason: HOSPADM

## 2023-09-19 RX ORDER — ACETAMINOPHEN 325 MG/1
650 TABLET ORAL EVERY 6 HOURS PRN
Status: DISCONTINUED | OUTPATIENT
Start: 2023-09-19 | End: 2023-09-20 | Stop reason: HOSPADM

## 2023-09-19 RX ORDER — ONDANSETRON 2 MG/ML
4 INJECTION INTRAMUSCULAR; INTRAVENOUS ONCE
Status: COMPLETED | OUTPATIENT
Start: 2023-09-19 | End: 2023-09-19

## 2023-09-19 RX ORDER — SODIUM CHLORIDE 9 MG/ML
INJECTION, SOLUTION INTRAVENOUS CONTINUOUS
Status: DISCONTINUED | OUTPATIENT
Start: 2023-09-19 | End: 2023-09-20 | Stop reason: HOSPADM

## 2023-09-19 RX ORDER — ONDANSETRON 4 MG/1
4 TABLET, ORALLY DISINTEGRATING ORAL EVERY 6 HOURS PRN
Status: DISCONTINUED | OUTPATIENT
Start: 2023-09-19 | End: 2023-09-20 | Stop reason: HOSPADM

## 2023-09-19 RX ORDER — ACETAMINOPHEN 650 MG/1
650 SUPPOSITORY RECTAL EVERY 6 HOURS PRN
Status: DISCONTINUED | OUTPATIENT
Start: 2023-09-19 | End: 2023-09-20 | Stop reason: HOSPADM

## 2023-09-19 RX ORDER — LIDOCAINE 40 MG/G
CREAM TOPICAL
Status: DISCONTINUED | OUTPATIENT
Start: 2023-09-19 | End: 2023-09-20 | Stop reason: HOSPADM

## 2023-09-19 RX ADMIN — IOPAMIDOL 100 ML: 755 INJECTION, SOLUTION INTRAVENOUS at 05:45

## 2023-09-19 RX ADMIN — SODIUM CHLORIDE, PRESERVATIVE FREE: 5 INJECTION INTRAVENOUS at 20:24

## 2023-09-19 RX ADMIN — ONDANSETRON 4 MG: 2 INJECTION INTRAMUSCULAR; INTRAVENOUS at 04:17

## 2023-09-19 RX ADMIN — SERTRALINE HYDROCHLORIDE 100 MG: 100 TABLET ORAL at 08:10

## 2023-09-19 RX ADMIN — SODIUM CHLORIDE, PRESERVATIVE FREE: 5 INJECTION INTRAVENOUS at 08:11

## 2023-09-19 RX ADMIN — DIATRIZOATE MEGLUMINE AND DIATRIZOATE SODIUM 120 ML: 660; 100 SOLUTION ORAL; RECTAL at 09:31

## 2023-09-19 RX ADMIN — SODIUM CHLORIDE 1000 ML: 9 INJECTION, SOLUTION INTRAVENOUS at 04:14

## 2023-09-19 RX ADMIN — FAMOTIDINE 20 MG: 10 INJECTION, SOLUTION INTRAVENOUS at 04:17

## 2023-09-19 ASSESSMENT — ACTIVITIES OF DAILY LIVING (ADL)
FALL_HISTORY_WITHIN_LAST_SIX_MONTHS: NO
TOILETING_ISSUES: NO
DRESSING/BATHING_DIFFICULTY: NO
ADLS_ACUITY_SCORE: 35
ADLS_ACUITY_SCORE: 35
DOING_ERRANDS_INDEPENDENTLY_DIFFICULTY: NO
ADLS_ACUITY_SCORE: 35
ADLS_ACUITY_SCORE: 18
ADLS_ACUITY_SCORE: 18
WALKING_OR_CLIMBING_STAIRS_DIFFICULTY: NO
WEAR_GLASSES_OR_BLIND: NO
ADLS_ACUITY_SCORE: 35
ADLS_ACUITY_SCORE: 35
ADLS_ACUITY_SCORE: 18
CONCENTRATING,_REMEMBERING_OR_MAKING_DECISIONS_DIFFICULTY: NO
DIFFICULTY_EATING/SWALLOWING: NO
ADLS_ACUITY_SCORE: 35
CHANGE_IN_FUNCTIONAL_STATUS_SINCE_ONSET_OF_CURRENT_ILLNESS/INJURY: NO
ADLS_ACUITY_SCORE: 18

## 2023-09-19 NOTE — PROGRESS NOTES
Progress note follow-up     Passed a lot of gas today and feels a lot better  GG challenge in progress  Conservative management planned

## 2023-09-19 NOTE — ED NOTES
[facility]  ED Nurse Handoff Report    ED Chief complaint: Abdominal Pain  . ED Diagnosis:   Final diagnoses:   SBO (small bowel obstruction) (H)       Allergies:   Allergies   Allergen Reactions    Amoxicillin-Pot Clavulanate      Hives and swelling of genitals as a child       Code Status: Full Code    Activity level - Baseline/Home:  independent.  Activity Level - Current:   independent.   Lift room needed: No.   Bariatric: No   Needed: No   Isolation: No.   Infection: Not Applicable.     Respiratory status: Room air    Vital Signs (within 30 minutes):   Vitals:    09/19/23 0503 09/19/23 0830 09/19/23 0831 09/19/23 0842   BP:   119/64    Pulse:  79     Resp:   18    Temp:    99.1  F (37.3  C)   TempSrc:    Temporal   SpO2: 92%  99%        Cardiac Rhythm:  ,      Pain level:    Patient confused: No.   Patient Falls Risk: patient and family education.   Elimination Status: Has voided     Patient Report - Initial Complaint: Abdominal Pain.   Focused Assessment: Deion Richards is a 27 year old male who presents to the ED with LUQ abdominal pain. The patient reports that around 1800 he began having the abdominal pain and feeling gassy, although he did not pass any gas. He states that the pain increased over time, and he began vomiting severely, around 10 times before presentation. He reports that the abdominal pain was mostly localized to the LUQ, with some radiation around the abdomen. Denies any fever, chills, chest pain, shortness of breath diarrhea, or dysuria. Deion also denies eating anything strange for dinner, and notes that his daughter appears to have an ear infection but denies any other ill contacts. He also states that his alcohol consumption is limited to 1-3 drinks a week.         Abnormal Results:   Labs Ordered and Resulted from Time of ED Arrival to Time of ED Departure   COMPREHENSIVE METABOLIC PANEL - Abnormal       Result Value    Sodium 140      Potassium 3.6       Chloride 102      Carbon Dioxide (CO2) 24      Anion Gap 14      Urea Nitrogen 10.8      Creatinine 0.91      Calcium 10.3 (*)     Glucose 131 (*)     Alkaline Phosphatase 67      AST 21      ALT 20      Protein Total 7.8      Albumin 5.0      Bilirubin Total 0.9      GFR Estimate >90     CBC WITH PLATELETS AND DIFFERENTIAL - Abnormal    WBC Count 12.6 (*)     RBC Count 5.35      Hemoglobin 16.0      Hematocrit 43.7      MCV 82      MCH 29.9      MCHC 36.6 (*)     RDW 11.9      Platelet Count 274      % Neutrophils 91      % Lymphocytes 5      % Monocytes 4      % Eosinophils 0      % Basophils 0      % Immature Granulocytes 0      NRBCs per 100 WBC 0      Absolute Neutrophils 11.3 (*)     Absolute Lymphocytes 0.7 (*)     Absolute Monocytes 0.5      Absolute Eosinophils 0.0      Absolute Basophils 0.0      Absolute Immature Granulocytes 0.1      Absolute NRBCs 0.0     LIPASE - Normal    Lipase 14     INFLUENZA A/B, RSV, & SARS-COV2 PCR - Normal    Influenza A PCR Negative      Influenza B PCR Negative      RSV PCR Negative      SARS CoV2 PCR Negative          CT Abdomen Pelvis w Contrast   Final Result   IMPRESSION:    1.  Multiple dilated fluid-filled loops of small bowel with transition point in the right lower abdomen, compatible with a small bowel obstruction. Trace ascites tracking into the dependent pelvis.      XR Gastrografin Challenge    (Results Pending)       Treatments provided: See eMAR  Family Comments: Wife aware per pt  OBS brochure/video discussed/provided to patient:  No  ED Medications:   Medications   sertraline (ZOLOFT) tablet 100 mg (100 mg Oral $Given 9/19/23 0810)   lidocaine 1 % 0.1-1 mL (has no administration in time range)   lidocaine (LMX4) cream (has no administration in time range)   sodium chloride (PF) 0.9% PF flush 3 mL (3 mLs Intracatheter $Given 9/19/23 0811)   sodium chloride (PF) 0.9% PF flush 3 mL (has no administration in time range)   acetaminophen (TYLENOL) tablet 650 mg  (has no administration in time range)     Or   acetaminophen (TYLENOL) Suppository 650 mg (has no administration in time range)   magnesium hydroxide (MILK OF MAGNESIA) suspension 30 mL (has no administration in time range)   ondansetron (ZOFRAN ODT) ODT tab 4 mg (has no administration in time range)     Or   ondansetron (ZOFRAN) injection 4 mg (has no administration in time range)   alum & mag hydroxide-simethicone (MAALOX) suspension 30 mL (has no administration in time range)   sodium chloride 0.9% infusion ( Intravenous $New Bag 9/19/23 0811)   oxyCODONE IR (ROXICODONE) half-tab 2.5 mg (has no administration in time range)   oxyCODONE (ROXICODONE) tablet 5 mg (has no administration in time range)   morphine (PF) injection 1 mg (has no administration in time range)   morphine (PF) injection 2 mg (has no administration in time range)   sodium chloride 0.9% BOLUS 1,000 mL (0 mLs Intravenous Stopped 9/19/23 0600)   ondansetron (ZOFRAN) injection 4 mg (4 mg Intravenous $Given 9/19/23 0417)   famotidine (PEPCID) injection 20 mg (20 mg Intravenous $Given 9/19/23 0417)   iopamidol (ISOVUE-370) solution 500 mL (100 mLs Intravenous $Given 9/19/23 0545)   sodium chloride (PF) 0.9% PF flush 100 mL (65 mLs Intravenous $Given 9/19/23 0545)   diatrizoate meglumine-sodium (GASTROGRAFIN/GASTROVIEW) 66-10 % solution 120 mL (120 mLs Oral $Given 9/19/23 0931)       Drips infusing:  Yes  For the majority of the shift this patient was Green.   Interventions performed were See eMAR.    Sepsis treatment initiated: No    Cares/treatment/interventions/medications to be completed following ED care: N/A    ED Nurse Name: Nicolette Sin RN  9:52 AM  RECEIVING UNIT ED HANDOFF REVIEW    Above ED Nurse Handoff Report was reviewed: Yes  Reviewed by: Aleida Neves RN on September 19, 2023 at 2:39 PM

## 2023-09-19 NOTE — H&P
History and Physical     Deion Richards MRN# [id number]   YOB: 1995 Age: 27 year old      Date of Admission:  9/19/2023    Primary care provider: Jodee Chou          Assessment and Plan:     Summary of Stay: Deion Richards is a 27 year old male with hx of anxiety admitted on 9/19/2023 with abrupt onset of abdominal pain and found to have an SBO     He notes that around 630 pm last not he had onset of intense abdominal pain followed by lots of vomiting.  His abdomen is bloated and he's been unable to pass gas or have a BM even though he feels like he needs to    ER evaluation - temp 99.1, but otherwise VSS  CMP with mildly elevated calcium at 10.3 and   CBC with mild leukocytosis 12.6 with leukocytosis and lymphopenia    Covid/flu/rsv negative    CT CAP Multiple dilated fluid-filled loops of small bowel with transition point in the right lower abdomen, compatible with a small bowel obstruction. Trace ascites tracking into the dependent pelvis.     Problem List:   SBO no hx of abdominal surgeries or abdominal trauma so etiology not clear.  Symptoms much improved with IVF and ondansetron.  Exam though still with mod distension, mild tenderness, and quiet bs  -NPO x meds, IVF to prevent dehydration, prn nausea and pain control   -surgery consultation  -when clears if no surgery indicated should probably get SBFT 3-4 weeks after resolution to further evaluate              Hypercalcemia  Minimal and clinically insignificant.  Likely due to dehydration   Edward in am     Hyperglycemia  Likely stress reaction, no need to monitor       DVT Prophylaxis: Pneumatic Compression Devices  Code Status: Full Code  Functional Status: independent   [place]: not needed  Access:  PIV              Time spent 45 minutes reviewing epic including notes/labs/prior hx, current medications.  In addition to interviewing and examining the patient, updated patient and family regarding plan of care          Chief  Complaint:     Abdominal pain        History of Present Illness:   Deion Richards is a 27 year old male with hx of anxiety admitted on 9/19/2023 with abrupt onset of abdominal pain and found to have an SBO     He notes that around 630 pm last not he had onset of intense abdominal pain followed by lots of vomiting.  His abdomen is bloated and he's been unable to pass gas or have a BM even though he feels like he needs to    ER evaluation - temp 99.1, but otherwise VSS  CMP with mildly elevated calcium at 10.3 and   CBC with mild leukocytosis 12.6 with leukocytosis and lymphopenia      The history is obtained in discussion with the ER provider Dr Avila and the patient with good reliability       Epic and Care everywhere were extensively reviewed        Past Medical History:     Past Medical History:   Diagnosis Date    Anxiety     Patellar tendonitis              Past Surgical History:     Past Surgical History:   Procedure Laterality Date    NO HISTORY OF SURGERY               Social History:     Social History     Tobacco Use    Smoking status: Never    Smokeless tobacco: Never   Substance Use Topics    Alcohol use: Yes     Comment: wine or bourbon or beer- once a week, if not at all.             Family History:   I have reviewed this patient's family history         Allergies:     Allergies   Allergen Reactions    Amoxicillin-Pot Clavulanate              Medications:     Sertraline 100 mg every day           Review of Systems:     A Comprehensive greater than 10 system review of systems was carried out.  Pertinent positives and negatives are noted above.  Otherwise negative for contributory information.           Physical Exam:   Blood pressure 129/75, pulse 73, temperature 99.1  F (37.3  C), temperature source Oral, resp. rate 14, SpO2 97 %.  Exam:    General:  Pleasant nad looks stated age  HEENT:  Head nc/at sclera clear PER  Neck is supple  Lungs: cta b nl effort   CV:  RRR no m/r/g no le edema  Abd:   mod distension some voluntary guarding bs rare mild ttp   Neuro:  Cn 2-12 grossly intact and kapadia   Alert and oriented affect appropriate   Skin:  W/d no c/c               Data:     Results for orders placed or performed during the hospital encounter of 09/19/23   CT Abdomen Pelvis w Contrast     Status: None    Narrative    EXAM: CT ABDOMEN PELVIS W CONTRAST  LOCATION: [facility]  DATE: 9/19/2023    INDICATION: abdominal pain  COMPARISON: None.  TECHNIQUE: CT scan of the abdomen and pelvis was performed following injection of IV contrast. Multiplanar reformats were obtained. Dose reduction techniques were used.  CONTRAST: 100mL Isovue 370    FINDINGS:   LOWER CHEST: Bibasilar atelectasis.    HEPATOBILIARY: Normal.    PANCREAS: Normal.    SPLEEN: Normal.    ADRENAL GLANDS: Normal.    KIDNEYS/BLADDER: Normal.    BOWEL: Trace ascites tracking into the dependent pelvis. Normal appendix. Multiple dilated fluid-filled loops of small bowel with transition point in the right lower abdomen, compatible with a small bowel obstruction.    LYMPH NODES: Normal.    VASCULATURE: Unremarkable.    PELVIC ORGANS: Normal.    MUSCULOSKELETAL: Normal.      Impression    IMPRESSION:   1.  Multiple dilated fluid-filled loops of small bowel with transition point in the right lower abdomen, compatible with a small bowel obstruction. Trace ascites tracking into the dependent pelvis.   Comprehensive metabolic panel     Status: Abnormal   Result Value Ref Range    Sodium 140 136 - 145 mmol/L    Potassium 3.6 3.4 - 5.3 mmol/L    Chloride 102 98 - 107 mmol/L    Carbon Dioxide (CO2) 24 22 - 29 mmol/L    Anion Gap 14 7 - 15 mmol/L    Urea Nitrogen 10.8 6.0 - 20.0 mg/dL    Creatinine 0.91 0.67 - 1.17 mg/dL    Calcium 10.3 (H) 8.6 - 10.0 mg/dL    Glucose 131 (H) 70 - 99 mg/dL    Alkaline Phosphatase 67 40 - 129 U/L    AST 21 0 - 45 U/L    ALT 20 0 - 70 U/L    Protein Total 7.8 6.4 - 8.3 g/dL    Albumin 5.0 3.5 - 5.2 g/dL     Bilirubin Total 0.9 <=1.2 mg/dL    GFR Estimate >90 >60 mL/min/1.73m2   Lipase     Status: Normal   Result Value Ref Range    Lipase 14 13 - 60 U/L   CBC with platelets and differential     Status: Abnormal   Result Value Ref Range    WBC Count 12.6 (H) 4.0 - 11.0 10e3/uL    RBC Count 5.35 4.40 - 5.90 10e6/uL    Hemoglobin 16.0 13.3 - 17.7 g/dL    Hematocrit 43.7 40.0 - 53.0 %    MCV 82 78 - 100 fL    MCH 29.9 26.5 - 33.0 pg    MCHC 36.6 (H) 31.5 - 36.5 g/dL    RDW 11.9 10.0 - 15.0 %    Platelet Count 274 150 - 450 10e3/uL    % Neutrophils 91 %    % Lymphocytes 5 %    % Monocytes 4 %    % Eosinophils 0 %    % Basophils 0 %    % Immature Granulocytes 0 %    NRBCs per 100 WBC 0 <1 /100    Absolute Neutrophils 11.3 (H) 1.6 - 8.3 10e3/uL    Absolute Lymphocytes 0.7 (L) 0.8 - 5.3 10e3/uL    Absolute Monocytes 0.5 0.0 - 1.3 10e3/uL    Absolute Eosinophils 0.0 0.0 - 0.7 10e3/uL    Absolute Basophils 0.0 0.0 - 0.2 10e3/uL    Absolute Immature Granulocytes 0.1 <=0.4 10e3/uL    Absolute NRBCs 0.0 10e3/uL   Symptomatic Influenza A/B, RSV, & SARS-CoV2 PCR (COVID-19) Nose     Status: Normal    Specimen: Nose; Swab   Result Value Ref Range    Influenza A PCR Negative Negative    Influenza B PCR Negative Negative    RSV PCR Negative Negative    SARS CoV2 PCR Negative Negative    Narrative    Testing was performed using the Xpert Xpress CoV2/Flu/RSV Assay on the [facility] GeneXpert Instrument. This test should be ordered for the detection of SARS-CoV-2, influenza, and RSV viruses in individuals who meet clinical and/or epidemiological criteria. Test performance is unknown in asymptomatic patients. This test is for in vitro diagnostic use under the FDA EUA for laboratories certified under CLIA to perform high or moderate complexity testing. This test has not been FDA cleared or approved. A negative result does not rule out the presence of PCR inhibitors in the specimen or target RNA in concentration below the limit of detection for  the assay. If only one viral target is positive but coinfection with multiple targets is suspected, the sample should be re-tested with another FDA cleared, approved, or authorized test, if coinfection would change clinical management. This test was validated by the [facility]. These laboratories are certified under the Clinical Laboratory Improvement Amendments of 1988 (CLIA-88) as qualified to perform high complexity laboratory testing.   CBC with platelets differential     Status: Abnormal    Narrative    The following orders were created for panel order CBC with platelets differential.  Procedure                               Abnormality         Status                     ---------                               -----------         ------                     CBC with platelets and d...[[id number]]  Abnormal            Final result                 Please view results for these tests on the individual orders.

## 2023-09-19 NOTE — PLAN OF CARE
"Goal Outcome Evaluation: progressing    Pt states + loose BM at 1430, then 2 other loose stools prior +passing gas.  Denies pain and nausea.  Feels hungry/thirsty, \"100% Fine now.\" This writer asked MD, per request of patient if he has \"any chance of going home.\"  Per Dr Pena, need to await GG challenge results.  Pt updated. Bowel sounds are audible x4, more hypoactive than active, but definitely audible.    Patient Transfer Information  Patient connected to monitoring equipment on arrival: yes Vital signs monitor     Patient connected to wall oxygen on arrival: N/A    Belongings: Transferred with patient    Safety check completed: Yes                         "

## 2023-09-19 NOTE — CONSULTS
General Surgery Consultation    Deion Richards MRN# [id number]   Age: 27 year old YOB: 1995     Date of Admission:  9/19/2023    Reason for consult:            Small bowel obstruction       Requesting physician:            Dr Pena                Assessment and Plan:   Assessment:   Deion Richards is a 27 year old male with no significant medical history and no abdominal surgical history admitted with possible SBO. He is currently stable, with benign abdominal exam and is starting to pass gas.     Comorbidities:   has a past medical history of Anxiety and Patellar tendonitis.      Plan:   Continue conservative management with bowel rest, npo, IVF  Gastografin challenge today   Surgical intervention may be needed if the clinical picture worsens or if the patient fails to progress with conservative measure.             Chief Complaint:   Abdominal pain, nausea, vomiting     History is obtained from the patient         History of Present Illness:   Deion Richards is a 27 year old male with no significant medical history who presented to the ED this morning with acute onset abdominal pain, nausea and vomiting. His symptoms started around 630 pm last night and worsened throughout the night. He has never experienced this before. He has never had abdominal surgery. He has a family history of colon cancer in his grandfather and is getting a colonoscopy in October. He feels better this morning and denies abdominal pain or nausea or vomiting this morning. He passed a lot of gas this morning as well.            Past Medical History:     Past Medical History:   Diagnosis Date    Anxiety     Patellar tendonitis              Past Surgical History:     Past Surgical History:   Procedure Laterality Date    NO HISTORY OF SURGERY               Social History:     Social History     Tobacco Use    Smoking status: Never    Smokeless tobacco: Never   Substance Use Topics    Alcohol use: Yes     Comment: wine or bourbon  or beer- once a week, if not at all.             Family History:     Family History   Problem Relation Age of Onset    Ulcerative Colitis Father     Ulcerative Colitis Maternal Grandfather     Colon Cancer Maternal Grandfather     Diabetes Maternal Grandfather     Obsessive Compulsive Disorder Maternal Grandfather     Asthma Maternal Grandfather             Allergies:     Allergies   Allergen Reactions    Amoxicillin-Pot Clavulanate      Hives and swelling of genitals as a child             Medications:   No current facility-administered medications on file prior to encounter.  sertraline (ZOLOFT) 100 MG tablet, Take 1 tablet (100 mg) by mouth daily       sertraline  100 mg Oral Daily    sodium chloride (PF)  3 mL Intracatheter Q8H            Review of Systems:   The 10 point review of systems is negative other than noted in the HPI.          Physical Exam:   /64   Pulse 79   Temp 99.1  F (37.3  C) (Temporal)   Resp 18   SpO2 99%   General - Well developed, well nourished male in no apparent distress  Lungs: normal effort on RA   Heart: regular rate and rhythm  Abdomen: soft, not distended, not tender throughout, no guarding or rebound   MSK: Extremities warm without edema   Neurologic: nonfocal  Psychiatric: Mood and affect appropriate  Skin: Without lesions, rashes, or jaundice         Data:   Labs reviewed    Imaging:  All imaging studies reviewed by me and my interpretation of the CT is: dilated small bowel with possible transition point in the RLQ       Huber Elizabeth MD  9/19/2023 10:09 AM     Time spent with the patient, reviewing the EMR, reviewing laboratory and imaging studies, more than 50% of which was counseling and coordinating care:  45 minutes.

## 2023-09-19 NOTE — ED PROVIDER NOTES
History     Chief Complaint:  Abdominal Pain       The history is provided by the patient.      Deion Richards is a 27 year old male who presents to the ED with LUQ abdominal pain. The patient reports that around 1800 he began having the abdominal pain and feeling gassy, although he did not pass any gas. He states that the pain increased over time, and he began vomiting severely, around 10 times before presentation. He reports that the abdominal pain was mostly localized to the LUQ, with some radiation around the abdomen. Denies any fever, chills, chest pain, shortness of breath diarrhea, or dysuria. Deion also denies eating anything strange for dinner, and notes that his daughter appears to have an ear infection but denies any other ill contacts. He also states that his alcohol consumption is limited to 1-3 drinks a week.     Independent Historian:    None - patient only    Review of External Notes:  8/2/23    Medications:    Anusol - HC  Zoloft    Past Medical History:    Patellar tendonitis  CRISTOBAL   External hemorrhoids    Physical Exam   Patient Vitals for the past 24 hrs:   BP Temp Temp src Pulse Resp SpO2   09/19/23 0404 129/75 99.1  F (37.3  C) Oral 73 14 97 %        Physical Exam  Nursing note and vitals reviewed.  Constitutional: Well nourished.  Eyes: Conjunctiva normal.  Pupils are equal, round, and reactive to light.   ENT: Nose normal. Mucous membranes pink and moist.    Neck: Normal range of motion.  CVS: Normal rate, regular rhythm.  Normal heart sounds.    Pulmonary: Lungs clear to auscultation bilaterally. No wheezes/rales/rhonchi.  GI: Abdomen soft. Generalized abdominal tenderness. No rigidity or guarding.    MSK: No calf tenderness or swelling.  Neuro: Alert. Follows simple commands.  Skin: Skin is warm and dry. No rash noted.   Psychiatric: Normal affect.       Emergency Department Course     Imaging:  CT Abdomen Pelvis w Contrast   Final Result   IMPRESSION:    1.  Multiple dilated  fluid-filled loops of small bowel with transition point in the right lower abdomen, compatible with a small bowel obstruction. Trace ascites tracking into the dependent pelvis.        Report per radiology    Laboratory:  Labs Ordered and Resulted from Time of ED Arrival to Time of ED Departure   COMPREHENSIVE METABOLIC PANEL - Abnormal       Result Value    Sodium 140      Potassium 3.6      Chloride 102      Carbon Dioxide (CO2) 24      Anion Gap 14      Urea Nitrogen 10.8      Creatinine 0.91      Calcium 10.3 (*)     Glucose 131 (*)     Alkaline Phosphatase 67      AST 21      ALT 20      Protein Total 7.8      Albumin 5.0      Bilirubin Total 0.9      GFR Estimate >90     CBC WITH PLATELETS AND DIFFERENTIAL - Abnormal    WBC Count 12.6 (*)     RBC Count 5.35      Hemoglobin 16.0      Hematocrit 43.7      MCV 82      MCH 29.9      MCHC 36.6 (*)     RDW 11.9      Platelet Count 274      % Neutrophils 91      % Lymphocytes 5      % Monocytes 4      % Eosinophils 0      % Basophils 0      % Immature Granulocytes 0      NRBCs per 100 WBC 0      Absolute Neutrophils 11.3 (*)     Absolute Lymphocytes 0.7 (*)     Absolute Monocytes 0.5      Absolute Eosinophils 0.0      Absolute Basophils 0.0      Absolute Immature Granulocytes 0.1      Absolute NRBCs 0.0     LIPASE - Normal    Lipase 14     INFLUENZA A/B, RSV, & SARS-COV2 PCR - Normal    Influenza A PCR Negative      Influenza B PCR Negative      RSV PCR Negative      SARS CoV2 PCR Negative          Emergency Department Course & Assessments:             Interventions:  Medications   sodium chloride 0.9% BOLUS 1,000 mL (1,000 mLs Intravenous $New Bag 9/19/23 0414)   ondansetron (ZOFRAN) injection 4 mg (4 mg Intravenous $Given 9/19/23 0417)   famotidine (PEPCID) injection 20 mg (20 mg Intravenous $Given 9/19/23 0417)        Assessments:  0430 I obtained history and examined the patient as noted above.        Consultations/Discussion of Management or Tests:  I spoke to  Dr. Pena       Social Determinants of Health affecting care:  none     Disposition:  The patient was admitted to the hospital under the care of Dr. Pena.     Impression & Plan        Medical Decision Making:  Patient is a 28 yo male who presented with abdominal pain, nausea and vomiting.  CT confirms a small bowel obstruction without evidence of related gut ischemia, and there is no acidosis or systemic toxicity to suggest this either.  Pain is controlled with interventions in the Emergency Department. There is no vomiting here in the ED and the patient wishes to hold off on NG tube at this time. Noted leukocytosis though I suspect this is more reactive 2/2 to vomiting.  The patient will be admitted to the hospitalist service with likely surgery consult if there is persistent symptoms given his multiple surgeries in the past.  The patient is stable on final re-evaluation and I believe is safe for admission at this time.    Diagnosis:    ICD-10-CM    1. SBO (small bowel obstruction) (H)  K56.609            Discharge Medications:  New Prescriptions    No medications on file      Scribe Disclosure:  ISadiq, am serving as a scribe at 4:25 AM on 9/19/2023 to document services personally performed by Merline Avila DO, based on my observations and the provider's statements to me.    IDeborah, am serving as a scribe at 4:41 AM on 9/19/2023 to document services personally performed by Merline Avila DO based on my observations and the provider's statements to me.    9/19/2023   Merline Avila DO McDonald, Lindsey E, DO  09/19/23 0629

## 2023-09-19 NOTE — PHARMACY-ADMISSION MEDICATION HISTORY
Pharmacist Admission Medication History    Admission medication history is complete. The information provided in this note is only as accurate as the sources available at the time of the update.    Medication reconciliation/reorder completed by provider prior to medication history? Yes    Information Source(s): Patient via in-person    Pertinent Information: None    Changes made to PTA medication list:  Added: None  Deleted: Hydrocortisone supp  Changed: None    Medication Affordability:  Not including over the counter (OTC) medications, was there a time in the past 3 months when you did not take your medications as prescribed because of cost?: No    Allergies reviewed with patient and updates made in EHR: yes    Medication History Completed By: Yasmany Keith RPH 9/19/2023 8:09 AM    Prior to Admission medications    Medication Sig Last Dose Taking? Auth Provider Long Term End Date   sertraline (ZOLOFT) 100 MG tablet Take 1 tablet (100 mg) by mouth daily 9/18/2023 Yes Jodee Chou APRN CNP Yes

## 2023-09-20 VITALS
TEMPERATURE: 98.1 F | RESPIRATION RATE: 16 BRPM | SYSTOLIC BLOOD PRESSURE: 115 MMHG | WEIGHT: 225.2 LBS | BODY MASS INDEX: 31.53 KG/M2 | DIASTOLIC BLOOD PRESSURE: 54 MMHG | OXYGEN SATURATION: 99 % | HEIGHT: 71 IN | HEART RATE: 63 BPM

## 2023-09-20 LAB
ALBUMIN SERPL BCG-MCNC: 4.1 G/DL (ref 3.5–5.2)
ALP SERPL-CCNC: 50 U/L (ref 40–129)
ALT SERPL W P-5'-P-CCNC: 14 U/L (ref 0–70)
ANION GAP SERPL CALCULATED.3IONS-SCNC: 4 MMOL/L (ref 7–15)
AST SERPL W P-5'-P-CCNC: 16 U/L (ref 0–45)
BILIRUB SERPL-MCNC: 0.9 MG/DL
BUN SERPL-MCNC: 11.1 MG/DL (ref 6–20)
CALCIUM SERPL-MCNC: 9.1 MG/DL (ref 8.6–10)
CHLORIDE SERPL-SCNC: 108 MMOL/L (ref 98–107)
CREAT SERPL-MCNC: 1.08 MG/DL (ref 0.67–1.17)
DEPRECATED HCO3 PLAS-SCNC: 28 MMOL/L (ref 22–29)
EGFRCR SERPLBLD CKD-EPI 2021: >90 ML/MIN/1.73M2
ERYTHROCYTE [DISTWIDTH] IN BLOOD BY AUTOMATED COUNT: 12.4 % (ref 10–15)
GLUCOSE SERPL-MCNC: 103 MG/DL (ref 70–99)
HCT VFR BLD AUTO: 40.4 % (ref 40–53)
HGB BLD-MCNC: 13.9 G/DL (ref 13.3–17.7)
MCH RBC QN AUTO: 29.7 PG (ref 26.5–33)
MCHC RBC AUTO-ENTMCNC: 34.4 G/DL (ref 31.5–36.5)
MCV RBC AUTO: 86 FL (ref 78–100)
PLATELET # BLD AUTO: 207 10E3/UL (ref 150–450)
POTASSIUM SERPL-SCNC: 4.2 MMOL/L (ref 3.4–5.3)
PROT SERPL-MCNC: 6.3 G/DL (ref 6.4–8.3)
RBC # BLD AUTO: 4.68 10E6/UL (ref 4.4–5.9)
SODIUM SERPL-SCNC: 140 MMOL/L (ref 136–145)
WBC # BLD AUTO: 7.4 10E3/UL (ref 4–11)

## 2023-09-20 PROCEDURE — 85027 COMPLETE CBC AUTOMATED: CPT | Performed by: INTERNAL MEDICINE

## 2023-09-20 PROCEDURE — 80053 COMPREHEN METABOLIC PANEL: CPT | Performed by: INTERNAL MEDICINE

## 2023-09-20 PROCEDURE — 36415 COLL VENOUS BLD VENIPUNCTURE: CPT | Performed by: INTERNAL MEDICINE

## 2023-09-20 PROCEDURE — 99231 SBSQ HOSP IP/OBS SF/LOW 25: CPT | Performed by: PHYSICIAN ASSISTANT

## 2023-09-20 PROCEDURE — 99239 HOSP IP/OBS DSCHRG MGMT >30: CPT | Performed by: HOSPITALIST

## 2023-09-20 PROCEDURE — 250N000013 HC RX MED GY IP 250 OP 250 PS 637: Performed by: INTERNAL MEDICINE

## 2023-09-20 RX ADMIN — SERTRALINE HYDROCHLORIDE 100 MG: 100 TABLET ORAL at 07:37

## 2023-09-20 ASSESSMENT — ACTIVITIES OF DAILY LIVING (ADL)
ADLS_ACUITY_SCORE: 18

## 2023-09-20 NOTE — PROGRESS NOTES
"[facility]   General Surgery Progress Note           Assessment and Plan:   Assessment:   Possible SBO, clinically resolved  GG challenge showed contrast reached colon within 8 hours      Plan:   -ok to discharge home         Interval History:   Feels back to normal, no abd pain or bloating. Tolerating regular diet.+ passing flatus and having liquid BMs. Wants to go home.         Physical Exam:   Blood pressure 115/54, pulse 63, temperature 98.1  F (36.7  C), temperature source Oral, resp. rate 16, height 1.803 m (5' 11\"), weight 102.2 kg (225 lb 3.2 oz), SpO2 99 %.    I/O last 3 completed shifts:  In: 586.25 [I.V.:586.25]  Out: -     Abdomen:   soft, non-distended, non-tender and normal bowel sounds           Data:     Recent Labs   Lab 09/20/23  0648 09/19/23  0412   WBC 7.4 12.6*   HGB 13.9 16.0   HCT 40.4 43.7   MCV 86 82    274       Kasia Cates PA-C  Text page: [phone] 8 am to 4 pm  After 4 pm, call [phone]       "

## 2023-09-20 NOTE — PLAN OF CARE
A&O x 4. Pt stable and ready for discharge, Vital signs stable. Discharge instructions, signs and symptoms to report, new medications, diet, activity, and follow up appointments reviewed with patient. No new medications  All questions answered and verbalized understanding.    IV out and belongings sent home with pt      Goal Outcome Evaluation:      Plan of Care Reviewed With: patient, spouse

## 2023-09-20 NOTE — DISCHARGE SUMMARY
[facility]    Discharge Summary  Hospitalist    Date of Admission:  9/19/2023  Date of Discharge:  9/20/2023  Provider:  Som Velásquez MD  Date of Service (when I last saw the patient): 09/20/23    Discharge Diagnoses   SBO unclear cause  Hypercalcemia, incidental  Hyperglycemia, incidental    Nutritional status   Other medical issues:  Past Medical History:   Diagnosis Date    Anxiety     Patellar tendonitis        History of Present Illness   Deion Richards is an 27 year old male who presented with abdominal pain.  Please see the admission history and physical for full details.    Hospital Course   Summary of Stay: Deion Richards is a 27 year old male with hx of anxiety admitted on 9/19/2023 with abrupt onset of abdominal pain and found to have an SBO      He notes that around 630 pm last not he had onset of intense abdominal pain followed by lots of vomiting.  His abdomen is bloated and he's been unable to pass gas or have a BM even though he feels like he needs to     ER evaluation - temp 99.1, but otherwise VSS  CMP with mildly elevated calcium at 10.3 and   CBC with mild leukocytosis 12.6 with leukocytosis and lymphopenia     Covid/flu/rsv negative     CT CAP Multiple dilated fluid-filled loops of small bowel with transition point in the right lower abdomen, compatible with a small bowel obstruction. Trace ascites tracking into the dependent pelvis.      Problem List:   SBO no hx of abdominal surgeries or abdominal trauma so etiology not clear.  Symptoms much improved with IVF and ondansetron.  Exam though still with mod distension, mild tenderness, and quiet bs  -Resolved with conservative treatment. Will follow up with PCP             Hypercalcemia  Minimal and clinically insignificant.  Likely due to dehydration   Edward in am      Hyperglycemia  Likely stress reaction, no need to monitor        # Discharge Pain Plan:    - Patient currently has NO PAIN and is not being  prescribed pain medications on discharge.      Significant Results and Procedures See bellow    Pending Results     Unresulted Labs Ordered in the Past 30 Days of this Admission       No orders found for last 31 day(s).            Code Status   Full Code       Primary Care Physician   Jodee Chou    GEN:  Alert, oriented x 3, appears comfortable, NAD.  HEENT:  Normocephalic/atraumatic, no scleral icterus, no nasal discharge, mouth moist.  CV:  Regular rate and rhythm, no murmur or JVD.  S1 + S2 noted, no S3 or S4.  LUNGS:  Clear to auscultation bilaterally without rales/rhonchi/wheezing/retractions.  Symmetric chest rise on inhalation noted.  ABD:  Active bowel sounds, soft, non-tender/non-distended.  No rebound/guarding/rigidity.  EXT:  No edema or cyanosis.  No joint synovitis noted.  SKIN:  Dry to touch, no exanthems noted in the visualized areas.     Discharge Disposition   Discharged to home    Consultations This Hospital Stay   SURGERY GENERAL IP CONSULT    Time Spent on this Encounter   I, Som Velásquez MD, personally saw the patient today and spent greater than 30 minutes discharging this patient.     Discharge Orders   No discharge procedures on file.  Discharge Medications   Current Discharge Medication List        CONTINUE these medications which have NOT CHANGED    Details   sertraline (ZOLOFT) 100 MG tablet Take 1 tablet (100 mg) by mouth daily  Qty: 90 tablet, Refills: 3    Associated Diagnoses: Anxiety           Allergies   Allergies   Allergen Reactions    Amoxicillin-Pot Clavulanate      Hives and swelling of genitals as a child     Data   Most Recent 3 CBC's:  Recent Labs   Lab Test 09/20/23  0648 09/19/23  0412 08/02/23  0939   WBC 7.4 12.6* 4.6   HGB 13.9 16.0 16.1   MCV 86 82 82    274 249      Most Recent 3 BMP's:  Recent Labs   Lab Test 09/20/23  0648 09/19/23  0412 08/02/23  0939    140 140   POTASSIUM 4.2 3.6 4.0   CHLORIDE 108* 102 103   CO2 28 24 25   BUN 11.1 10.8  11.8   CR 1.08 0.91 0.93   ANIONGAP 4* 14 12   ZAC 9.1 10.3* 10.2*   * 131* 96     Most Recent 2 LFT's:  Recent Labs   Lab Test 09/20/23  0648 09/19/23  0412   AST 16 21   ALT 14 20   ALKPHOS 50 67   BILITOTAL 0.9 0.9     Most Recent INR's and Anticoagulation Dosing History:  Anticoagulation Dose History        No data to display       Most Recent 3 Troponin's:No lab results found.  Most Recent Cholesterol Panel:No lab results found.  Most Recent 6 Bacteria Isolates From Any Culture (See EPIC Reports for Culture Details):No lab results found.  Most Recent TSH, T4 and A1c Labs:  Recent Labs   Lab Test 08/02/23  0939   TSH 1.34     Results for orders placed or performed during the hospital encounter of 09/19/23   CT Abdomen Pelvis w Contrast    Narrative    EXAM: CT ABDOMEN PELVIS W CONTRAST  LOCATION: [facility]  DATE: 9/19/2023    INDICATION: abdominal pain  COMPARISON: None.  TECHNIQUE: CT scan of the abdomen and pelvis was performed following injection of IV contrast. Multiplanar reformats were obtained. Dose reduction techniques were used.  CONTRAST: 100mL Isovue 370    FINDINGS:   LOWER CHEST: Bibasilar atelectasis.    HEPATOBILIARY: Normal.    PANCREAS: Normal.    SPLEEN: Normal.    ADRENAL GLANDS: Normal.    KIDNEYS/BLADDER: Normal.    BOWEL: Trace ascites tracking into the dependent pelvis. Normal appendix. Multiple dilated fluid-filled loops of small bowel with transition point in the right lower abdomen, compatible with a small bowel obstruction.    LYMPH NODES: Normal.    VASCULATURE: Unremarkable.    PELVIC ORGANS: Normal.    MUSCULOSKELETAL: Normal.      Impression    IMPRESSION:   1.  Multiple dilated fluid-filled loops of small bowel with transition point in the right lower abdomen, compatible with a small bowel obstruction. Trace ascites tracking into the dependent pelvis.   XR Gastrografin Challenge    Narrative    XR GASTROGRAFIN CHALLENGE   9/19/2023 5:57 PM      HISTORY: Small Bowel Obstruction    COMPARISON: Same date CT      Impression    IMPRESSION: Known partial small bowel obstruction better seen on  same-day CT. Administered oral contrast reached the colon by 8 hours.  Visualized lung bases are clear. No acute bony abnormality.    TERENCE MEJIAS MD         SYSTEM ID:  [id number]         Disclaimer: This note consists of symbols derived from keyboarding, dictation and/or voice recognition software. As a result, there may be errors in the script that have gone undetected. Please consider this when interpreting information found in this chart.

## 2023-09-20 NOTE — PLAN OF CARE
Alert and oriented x4, vital signs stable, denies abdominal pain, nausea, and vomiting. Tolerating clear liquid without any issues. Advanced to regular diet. Pt says he feels better and wants to go home. PIV infusing 100 ml/hr. Independent in his room. Pt is asking about going home. Educated on the need to stay overnight. Nursing will continue to monitor.

## 2023-09-20 NOTE — DISCHARGE INSTRUCTIONS
"HOME CARE FOLLOWING BOWEL OBSTRUCTION ADMISSION  EV Salcedo, ALEXIA Wilde, YANCY Card, ISIS Thomason    ACTIVITY:  Light Activity -- you may immediately be up and about as tolerated.  Driving -- you may drive when comfortable and off narcotic pain medications.  Light Work -- resume when comfortable off pain medications.  (If you can drive, you probably can work.)  Strenuous Work/Activity -- limit lifting to 15 pounds for 1-2 weeks.  Then, progressively increase with time.  Active Sports (running, biking, etc.) -- cautiously resume after 4 weeks, or when cleared by your surgeon.    DISCOMFORT:  Expect gradual improvement of residual abdominal soreness as your bowel function continues to return to normal over the following 1-2 weeks.  Please contact the office if you have worsening of abdominal pain, or onset of nausea/vomiting.    DIET:  Continue on a \"soft\" diet (i.e. cooked vegetables, soups, processed meats, light/white bread, mashed potatoes, rice, yogurt) for the first one to two week after discharge from the hospital.  After this time, you may return to diet you were on before surgery minimizing high cellulose foods and foods with \"skins\" (i.e.grapes, apple, citrus, corn) only.  This would include limiting: brussel sprouts, cabbage & kale, alfalfa sprouts, zucchini, squash, potatoes, carrots, and yams.  Drink plenty of fluids.    SURGEON CONTACT/APPOINTMENTS:  Office Phone:  [phone]     CONTACT US IF THE FOLLOWING DEVELOPS:   1. A fever that is above 101     2. Severe pain that is not relieved by your prescription.        If you have other questions, please call the office Monday thru Friday between 8am and 4:30pm to discuss with the nurse or physician assistant.  #[phone]    There is a surgeon ON CALL on weekday evenings and over the weekend in case of urgent need only, and may be contacted at the same number.    If you are having an emergency, call 911 or proceed to your " nearest emergency department.

## 2023-09-20 NOTE — PROGRESS NOTES
Surgery progress note:     XR reviewed. Contrast in the colon. Ok to start CLD and ADAT.     Huber Elizabeth MD

## 2023-10-26 ENCOUNTER — HOSPITAL ENCOUNTER (OUTPATIENT)
Facility: CLINIC | Age: 28
Discharge: HOME | End: 2023-10-26
Attending: INTERNAL MEDICINE | Admitting: INTERNAL MEDICINE
Payer: COMMERCIAL

## 2023-10-26 VITALS
WEIGHT: 215 LBS | HEIGHT: 71 IN | SYSTOLIC BLOOD PRESSURE: 115 MMHG | RESPIRATION RATE: 18 BRPM | DIASTOLIC BLOOD PRESSURE: 90 MMHG | BODY MASS INDEX: 30.1 KG/M2 | HEART RATE: 105 BPM | OXYGEN SATURATION: 94 %

## 2023-10-26 LAB — COLONOSCOPY: NORMAL

## 2023-10-26 PROCEDURE — G0500 MOD SEDAT ENDO SERVICE >5YRS: HCPCS | Performed by: INTERNAL MEDICINE

## 2023-10-26 PROCEDURE — 250N000011 HC RX IP 250 OP 636: Performed by: INTERNAL MEDICINE

## 2023-10-26 PROCEDURE — 45378 DIAGNOSTIC COLONOSCOPY: CPT | Performed by: INTERNAL MEDICINE

## 2023-10-26 RX ORDER — FLUMAZENIL 0.1 MG/ML
0.2 INJECTION, SOLUTION INTRAVENOUS
Status: DISCONTINUED | OUTPATIENT
Start: 2023-10-26 | End: 2023-10-26 | Stop reason: HOSPADM

## 2023-10-26 RX ORDER — NALOXONE HYDROCHLORIDE 0.4 MG/ML
0.2 INJECTION, SOLUTION INTRAMUSCULAR; INTRAVENOUS; SUBCUTANEOUS
Status: DISCONTINUED | OUTPATIENT
Start: 2023-10-26 | End: 2023-10-26 | Stop reason: HOSPADM

## 2023-10-26 RX ORDER — FENTANYL CITRATE 50 UG/ML
50-100 INJECTION, SOLUTION INTRAMUSCULAR; INTRAVENOUS EVERY 5 MIN PRN
Status: DISCONTINUED | OUTPATIENT
Start: 2023-10-26 | End: 2023-10-26 | Stop reason: HOSPADM

## 2023-10-26 RX ORDER — EPINEPHRINE 1 MG/ML
0.1 INJECTION, SOLUTION INTRAMUSCULAR; SUBCUTANEOUS
Status: DISCONTINUED | OUTPATIENT
Start: 2023-10-26 | End: 2023-10-26 | Stop reason: HOSPADM

## 2023-10-26 RX ORDER — ATROPINE SULFATE 0.1 MG/ML
1 INJECTION INTRAVENOUS
Status: DISCONTINUED | OUTPATIENT
Start: 2023-10-26 | End: 2023-10-26 | Stop reason: HOSPADM

## 2023-10-26 RX ORDER — ONDANSETRON 2 MG/ML
4 INJECTION INTRAMUSCULAR; INTRAVENOUS EVERY 6 HOURS PRN
Status: DISCONTINUED | OUTPATIENT
Start: 2023-10-26 | End: 2023-10-26 | Stop reason: HOSPADM

## 2023-10-26 RX ORDER — DIPHENHYDRAMINE HYDROCHLORIDE 50 MG/ML
25-50 INJECTION INTRAMUSCULAR; INTRAVENOUS
Status: DISCONTINUED | OUTPATIENT
Start: 2023-10-26 | End: 2023-10-26 | Stop reason: HOSPADM

## 2023-10-26 RX ORDER — NALOXONE HYDROCHLORIDE 0.4 MG/ML
0.4 INJECTION, SOLUTION INTRAMUSCULAR; INTRAVENOUS; SUBCUTANEOUS
Status: DISCONTINUED | OUTPATIENT
Start: 2023-10-26 | End: 2023-10-26 | Stop reason: HOSPADM

## 2023-10-26 RX ORDER — LIDOCAINE 40 MG/G
CREAM TOPICAL
Status: DISCONTINUED | OUTPATIENT
Start: 2023-10-26 | End: 2023-10-26 | Stop reason: HOSPADM

## 2023-10-26 RX ORDER — PROCHLORPERAZINE MALEATE 10 MG
10 TABLET ORAL EVERY 6 HOURS PRN
Status: DISCONTINUED | OUTPATIENT
Start: 2023-10-26 | End: 2023-10-26 | Stop reason: HOSPADM

## 2023-10-26 RX ORDER — SIMETHICONE 40MG/0.6ML
133 SUSPENSION, DROPS(FINAL DOSAGE FORM)(ML) ORAL
Status: DISCONTINUED | OUTPATIENT
Start: 2023-10-26 | End: 2023-10-26 | Stop reason: HOSPADM

## 2023-10-26 RX ORDER — ONDANSETRON 2 MG/ML
4 INJECTION INTRAMUSCULAR; INTRAVENOUS
Status: DISCONTINUED | OUTPATIENT
Start: 2023-10-26 | End: 2023-10-26 | Stop reason: HOSPADM

## 2023-10-26 RX ORDER — ONDANSETRON 4 MG/1
4 TABLET, ORALLY DISINTEGRATING ORAL EVERY 6 HOURS PRN
Status: DISCONTINUED | OUTPATIENT
Start: 2023-10-26 | End: 2023-10-26 | Stop reason: HOSPADM

## 2023-10-26 RX ADMIN — FENTANYL CITRATE 50 MCG: 50 INJECTION, SOLUTION INTRAMUSCULAR; INTRAVENOUS at 13:22

## 2023-10-26 RX ADMIN — MIDAZOLAM HYDROCHLORIDE 1 MG: 1 INJECTION, SOLUTION INTRAMUSCULAR; INTRAVENOUS at 13:22

## 2023-10-26 RX ADMIN — MIDAZOLAM HYDROCHLORIDE 2 MG: 1 INJECTION, SOLUTION INTRAMUSCULAR; INTRAVENOUS at 13:16

## 2023-10-26 RX ADMIN — FENTANYL CITRATE 50 MCG: 50 INJECTION, SOLUTION INTRAMUSCULAR; INTRAVENOUS at 13:29

## 2023-10-26 RX ADMIN — FENTANYL CITRATE 100 MCG: 50 INJECTION, SOLUTION INTRAMUSCULAR; INTRAVENOUS at 13:16

## 2023-10-26 RX ADMIN — MIDAZOLAM HYDROCHLORIDE 1 MG: 1 INJECTION, SOLUTION INTRAMUSCULAR; INTRAVENOUS at 13:29

## 2023-10-26 ASSESSMENT — ACTIVITIES OF DAILY LIVING (ADL): ADLS_ACUITY_SCORE: 35

## 2023-10-26 NOTE — H&P
Pre-Endoscopy History and Physical     Deion Richards MRN# [id number]   YOB: 1995 Age: 28 year old     Date of Procedure: 10/26/2023  Primary care provider: Jodee Chou  Type of Endoscopy: Colonoscopy with possible biopsy, possible polypectomy  Reason for Procedure: bleed  Type of Anesthesia Anticipated: Conscious Sedation    HPI:    Deion is a 28 year old male who will be undergoing the above procedure.      A history and physical has been performed. The patient's medications and allergies have been reviewed. The risks and benefits of the procedure and the sedation options and risks were discussed with the patient.  All questions were answered and informed consent was obtained.      He denies a personal or family history of anesthesia complications or bleeding disorders.     Patient Active Problem List   Diagnosis    Knee pain    Patellar tendonitis    CRISTOBAL (generalized anxiety disorder)    Elevated blood pressure reading without diagnosis of hypertension    Family history of colon cancer    External hemorrhoids    SBO (small bowel obstruction) (H)        Past Medical History:   Diagnosis Date    Anxiety     Patellar tendonitis         Past Surgical History:   Procedure Laterality Date    NO HISTORY OF SURGERY         Social History     Tobacco Use    Smoking status: Never    Smokeless tobacco: Never   Substance Use Topics    Alcohol use: Yes     Comment: wine or bourbon or beer- once a week, if not at all.       Family History   Problem Relation Age of Onset    Ulcerative Colitis Father     Ulcerative Colitis Maternal Grandfather     Colon Cancer Maternal Grandfather     Diabetes Maternal Grandfather     Obsessive Compulsive Disorder Maternal Grandfather     Asthma Maternal Grandfather        Prior to Admission medications    Medication Sig Start Date End Date Taking? Authorizing Provider   sertraline (ZOLOFT) 100 MG tablet Take 1 tablet (100 mg) by mouth daily 9/21/22  Yes Strong,  "Jodee Cardoza, PHILIP CNP       Allergies   Allergen Reactions    Amoxicillin-Pot Clavulanate      Hives and swelling of genitals as a child        REVIEW OF SYSTEMS:   5 point ROS negative except as noted above in HPI, including Gen., Resp., CV, GI &  system review.    PHYSICAL EXAM:   There were no vitals taken for this visit. Estimated body mass index is 31.41 kg/m  as calculated from the following:    Height as of 9/19/23: 1.803 m (5' 11\").    Weight as of 9/19/23: 102.2 kg (225 lb 3.2 oz).   GENERAL APPEARANCE: alert, and oriented  MENTAL STATUS: alert  AIRWAY EXAM: Mallampatti Class I (visualization of the soft palate, fauces, uvula, anterior and posterior pillars)  RESP: lungs clear to auscultation - no rales, rhonchi or wheezes  CV: regular rates and rhythm  DIAGNOSTICS:    Not indicated    IMPRESSION   ASA Class 2 - Mild systemic disease    PLAN:   Plan for Colonoscopy with possible biopsy, possible polypectomy. We discussed the risks, benefits and alternatives and the patient wished to proceed.    The above has been forwarded to the consulting provider.      Signed Electronically by: Chavez Li MD  October 26, 2023          "

## 2023-12-01 DIAGNOSIS — F41.9 ANXIETY: ICD-10-CM

## 2023-12-01 RX ORDER — SERTRALINE HYDROCHLORIDE 100 MG/1
100 TABLET, FILM COATED ORAL DAILY
Qty: 30 TABLET | Refills: 0 | Status: SHIPPED | OUTPATIENT
Start: 2023-12-01 | End: 2024-01-03

## 2023-12-03 ENCOUNTER — HEALTH MAINTENANCE LETTER (OUTPATIENT)
Age: 28
End: 2023-12-03

## 2023-12-30 DIAGNOSIS — F41.9 ANXIETY: ICD-10-CM

## 2024-01-03 RX ORDER — SERTRALINE HYDROCHLORIDE 100 MG/1
100 TABLET, FILM COATED ORAL DAILY
Qty: 90 TABLET | Refills: 0 | Status: SHIPPED | OUTPATIENT
Start: 2024-01-03 | End: 2024-02-28

## 2024-01-03 NOTE — TELEPHONE ENCOUNTER
Refilled; due for in person follow-up during the next 3 months.   Assist with scheduling.     Jodee Chou CNP

## 2024-01-03 NOTE — TELEPHONE ENCOUNTER
Sent Yekra message requesting a call back for an appt. Two more attempts will be made.    Paige Koroma  Lead

## 2024-02-27 ASSESSMENT — ANXIETY QUESTIONNAIRES
1. FEELING NERVOUS, ANXIOUS, OR ON EDGE: SEVERAL DAYS
2. NOT BEING ABLE TO STOP OR CONTROL WORRYING: SEVERAL DAYS
GAD7 TOTAL SCORE: 4
8. IF YOU CHECKED OFF ANY PROBLEMS, HOW DIFFICULT HAVE THESE MADE IT FOR YOU TO DO YOUR WORK, TAKE CARE OF THINGS AT HOME, OR GET ALONG WITH OTHER PEOPLE?: SOMEWHAT DIFFICULT
7. FEELING AFRAID AS IF SOMETHING AWFUL MIGHT HAPPEN: NOT AT ALL
GAD7 TOTAL SCORE: 4
4. TROUBLE RELAXING: SEVERAL DAYS
IF YOU CHECKED OFF ANY PROBLEMS ON THIS QUESTIONNAIRE, HOW DIFFICULT HAVE THESE PROBLEMS MADE IT FOR YOU TO DO YOUR WORK, TAKE CARE OF THINGS AT HOME, OR GET ALONG WITH OTHER PEOPLE: SOMEWHAT DIFFICULT
6. BECOMING EASILY ANNOYED OR IRRITABLE: NOT AT ALL
5. BEING SO RESTLESS THAT IT IS HARD TO SIT STILL: NOT AT ALL
GAD7 TOTAL SCORE: 4
7. FEELING AFRAID AS IF SOMETHING AWFUL MIGHT HAPPEN: NOT AT ALL
3. WORRYING TOO MUCH ABOUT DIFFERENT THINGS: SEVERAL DAYS

## 2024-02-28 ENCOUNTER — OFFICE VISIT (OUTPATIENT)
Dept: FAMILY MEDICINE | Facility: CLINIC | Age: 29
End: 2024-02-28
Payer: COMMERCIAL

## 2024-02-28 VITALS
DIASTOLIC BLOOD PRESSURE: 70 MMHG | TEMPERATURE: 98.2 F | BODY MASS INDEX: 32.27 KG/M2 | RESPIRATION RATE: 20 BRPM | OXYGEN SATURATION: 98 % | HEART RATE: 78 BPM | HEIGHT: 71 IN | SYSTOLIC BLOOD PRESSURE: 126 MMHG | WEIGHT: 230.5 LBS

## 2024-02-28 DIAGNOSIS — F41.9 ANXIETY: ICD-10-CM

## 2024-02-28 PROBLEM — S46.009A ROTATOR CUFF INJURY: Status: ACTIVE | Noted: 2023-04-26

## 2024-02-28 PROBLEM — R03.0 ELEVATED BLOOD PRESSURE READING WITHOUT DIAGNOSIS OF HYPERTENSION: Status: RESOLVED | Noted: 2023-08-02 | Resolved: 2024-02-28

## 2024-02-28 PROCEDURE — 99213 OFFICE O/P EST LOW 20 MIN: CPT | Performed by: NURSE PRACTITIONER

## 2024-02-28 RX ORDER — SERTRALINE HYDROCHLORIDE 100 MG/1
100 TABLET, FILM COATED ORAL DAILY
Qty: 90 TABLET | Refills: 3 | Status: SHIPPED | OUTPATIENT
Start: 2024-02-28

## 2024-02-28 RX ORDER — OMEGA-3 FATTY ACIDS/FISH OIL 300-1000MG
CAPSULE ORAL
COMMUNITY

## 2024-02-28 ASSESSMENT — PAIN SCALES - GENERAL: PAINLEVEL: NO PAIN (0)

## 2024-02-28 ASSESSMENT — ENCOUNTER SYMPTOMS: NERVOUS/ANXIOUS: 1

## 2024-02-28 NOTE — NURSING NOTE
"Chief Complaint   Patient presents with    Anxiety     Initial /70 (BP Location: Right arm, Patient Position: Sitting, Cuff Size: Adult Large)   Pulse 78   Temp 98.2  F (36.8  C) (Oral)   Resp 20   Ht 1.803 m (5' 11\")   Wt 104.6 kg (230 lb 8 oz)   SpO2 98%   BMI 32.15 kg/m   Estimated body mass index is 32.15 kg/m  as calculated from the following:    Height as of this encounter: 1.803 m (5' 11\").    Weight as of this encounter: 104.6 kg (230 lb 8 oz).  BP completed using cuff size large right arm    Lisa Magill, CMA    "

## 2024-02-28 NOTE — PROGRESS NOTES
Assessment & Plan     Anxiety  Chronic, stable.  Continue on current medication.   - sertraline (ZOLOFT) 100 MG tablet; Take 1 tablet (100 mg) by mouth daily        Follow-up 1 year     Sigrid Albarran is a 28 year old, presenting for the following health issues:  Anxiety        2/28/2024     9:44 AM   Additional Questions   Roomed by Lisa Magill, CMA   Accompanied by self         2/28/2024     9:44 AM   Patient Reported Additional Medications   Patient reports taking the following new medications NONE     Anxiety    History of Present Illness       Mental Health Follow-up:  Patient presents to follow-up on Anxiety.    Patient's anxiety since last visit has been:  Better  The patient is not having other symptoms associated with anxiety.  Any significant life events: financial concerns and other  Patient is feeling anxious or having panic attacks.  Patient has no concerns about alcohol or drug use.    He eats 2-3 servings of fruits and vegetables daily.He consumes 0 sweetened beverage(s) daily.He exercises with enough effort to increase his heart rate 60 or more minutes per day.  He exercises with enough effort to increase his heart rate 4 days per week.   He is taking medications regularly.         Anxiety Follow-Up  How are you doing with your anxiety since your last visit? Improved   Are you having other symptoms that might be associated with anxiety? No  Have you had a significant life event? Financial Concerns   Are you feeling depressed? No  Do you have any concerns with your use of alcohol or other drugs? No    Social History     Tobacco Use    Smoking status: Never    Smokeless tobacco: Never   Vaping Use    Vaping Use: Never used   Substance Use Topics    Alcohol use: Yes     Comment: wine or bourbon or beer- once a week, if not at all.    Drug use: No         1/26/2022     2:27 PM 9/21/2022     7:30 AM 2/27/2024     8:23 AM   CRISTOBAL-7 SCORE   Total Score 4 (minimal anxiety)  4 (minimal anxiety)   Total  "Score 4 1 4         12/29/2021    12:24 PM 1/26/2022     2:26 PM 9/21/2022     7:30 AM   PHQ   PHQ-9 Total Score 8 1 0   Q9: Thoughts of better off dead/self-harm past 2 weeks Not at all Not at all Not at all     Anxiety well controlled.   Second baby due in May.   Continues to exercise regularly, heavy weight lifting.   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       Review of Systems  Constitutional, HEENT, cardiovascular, pulmonary, gi and gu systems are negative, except as otherwise noted.      Objective    /70 (BP Location: Right arm, Patient Position: Sitting, Cuff Size: Adult Large)   Pulse 78   Temp 98.2  F (36.8  C) (Oral)   Resp 20   Ht 1.803 m (5' 11\")   Wt 104.6 kg (230 lb 8 oz)   SpO2 98%   BMI 32.15 kg/m    Body mass index is 32.15 kg/m .  Physical Exam   GENERAL: alert and no distress  PSYCH: mentation appears normal, affect normal/bright    No results found for this or any previous visit (from the past 24 hour(s)).        Signed Electronically by: PHILIP Mendez CNP    "

## 2024-04-03 ENCOUNTER — ALLIED HEALTH/NURSE VISIT (OUTPATIENT)
Dept: FAMILY MEDICINE | Facility: CLINIC | Age: 29
End: 2024-04-03
Payer: COMMERCIAL

## 2024-04-03 DIAGNOSIS — Z23 NEED FOR TETANUS BOOSTER: Primary | ICD-10-CM

## 2024-04-03 PROCEDURE — 99207 PR NO CHARGE NURSE ONLY: CPT

## 2024-04-03 PROCEDURE — 90471 IMMUNIZATION ADMIN: CPT

## 2024-04-03 PROCEDURE — 90715 TDAP VACCINE 7 YRS/> IM: CPT

## 2024-04-03 NOTE — PROGRESS NOTES
Prior to immunization administration, verified patients identity using patient s name and date of birth. Please see Immunization Activity for additional information.     Screening Questionnaire for Adult Immunization    Are you sick today?   No   Do you have allergies to medications, food, a vaccine component or latex?   Augmentin   Have you ever had a serious reaction after receiving a vaccination?   No   Do you have a long-term health problem with heart, lung, kidney, or metabolic disease (e.g., diabetes), asthma, a blood disorder, no spleen, complement component deficiency, a cochlear implant, or a spinal fluid leak?  Are you on long-term aspirin therapy?   No   Do you have cancer, leukemia, HIV/AIDS, or any other immune system problem?   No   Do you have a parent, brother, or sister with an immune system problem?   No   In the past 3 months, have you taken medications that affect  your immune system, such as prednisone, other steroids, or anticancer drugs; drugs for the treatment of rheumatoid arthritis, Crohn s disease, or psoriasis; or have you had radiation treatments?   No   Have you had a seizure, or a brain or other nervous system problem?   No   During the past year, have you received a transfusion of blood or blood    products, or been given immune (gamma) globulin or antiviral drug?   No   For women: Are you pregnant or is there a chance you could become       pregnant during the next month?   No   Have you received any vaccinations in the past 4 weeks?   No     Immunization questionnaire answers were all negative.    I have reviewed the following standing orders:   This patient is due and qualifies for a TDAP vaccine.    Click here for Tdap Standing Order    I have reviewed the vaccines inclusion and exclusion criteria; No concerns regarding eligibility.     Patient instructed to remain in clinic for 15 minutes afterwards, and to report any adverse reactions.     Screening performed by Shivani Hansen  MA on 4/3/2024 at 9:54 AM.

## 2024-09-15 ENCOUNTER — MYC MEDICAL ADVICE (OUTPATIENT)
Dept: FAMILY MEDICINE | Facility: CLINIC | Age: 29
End: 2024-09-15
Payer: COMMERCIAL

## 2024-09-15 DIAGNOSIS — K64.4 EXTERNAL HEMORRHOIDS: ICD-10-CM

## 2024-09-16 NOTE — TELEPHONE ENCOUNTER
Called patient and left voicemail and asked patient to call back. Attempt # 1. Also sent [employer] message.    Darlin Leavitt RN 9/16/2024  [facility]

## 2024-09-17 RX ORDER — HYDROCORTISONE ACETATE 25 MG/1
25 SUPPOSITORY RECTAL 2 TIMES DAILY
Qty: 14 SUPPOSITORY | Refills: 3 | Status: SHIPPED | OUTPATIENT
Start: 2024-09-17

## 2024-09-17 NOTE — TELEPHONE ENCOUNTER
Spoke to the Pt about the provider sending in his medication. Pt expressed understanding and will F/U w/ pharmacy about when the refill will be ready.     Arely Nichols   [facility]

## 2025-01-05 ENCOUNTER — HEALTH MAINTENANCE LETTER (OUTPATIENT)
Age: 30
End: 2025-01-05

## 2025-03-29 ENCOUNTER — MYC REFILL (OUTPATIENT)
Dept: FAMILY MEDICINE | Facility: CLINIC | Age: 30
End: 2025-03-29
Payer: COMMERCIAL

## 2025-03-29 DIAGNOSIS — F41.9 ANXIETY: ICD-10-CM

## 2025-03-31 RX ORDER — SERTRALINE HYDROCHLORIDE 100 MG/1
100 TABLET, FILM COATED ORAL DAILY
Qty: 90 TABLET | Refills: 0 | Status: SHIPPED | OUTPATIENT
Start: 2025-03-31

## 2025-03-31 RX ORDER — SERTRALINE HYDROCHLORIDE 100 MG/1
100 TABLET, FILM COATED ORAL
Qty: 90 TABLET | Refills: 3 | OUTPATIENT
Start: 2025-03-31

## 2025-05-27 ENCOUNTER — PATIENT OUTREACH (OUTPATIENT)
Dept: CARE COORDINATION | Facility: CLINIC | Age: 30
End: 2025-05-27
Payer: COMMERCIAL

## 2025-05-27 NOTE — PROGRESS NOTES
Clinic Care Coordination Contact  [facility]/Voicemail    Clinical Data: Care Coordinator Outreach    Outreach Documentation Number of Outreach Attempt   5/27/2025   9:51 AM 1       Left message on patient's voicemail with call back information and requested return call.      Plan: Care Coordinator will try to reach patient again in 1-2 business days.    Sandra BURGOS, B.S. [facility] Care Coordination  [facility]:  [place], [place] and [place]  [phone]  [email or web address]

## 2025-05-27 NOTE — LETTER
[facility] CARE COORDINATION  [address] [place] [address]    May 28, 2025    Deion Richards  [address] [place] [id number]      Dear Deion,    I am a clinic community health worker who works with PHILIP Mendez CNP with the [facility]. I have been trying to reach you recently to introduce Clinic Care Coordination. Below is a description of clinic care coordination and how I can further assist you.       The clinic care coordination team is made up of a registered nurse, , financial resource worker and community health worker who understand the health care system. The goal of clinic care coordination is to help you manage your health and improve access to the health care system. Our team works alongside your provider to assist you in determining your health and social needs. We can help you obtain health care and community resources, providing you with necessary information and education. We can work with you through any barriers and develop a care plan that helps coordinate and strengthen the communication between you and your care team.  Our services are voluntary and are offered without charge to you personally.    Please feel free to contact me with any questions or concerns regarding care coordination and what we can offer.      We are focused on providing you with the highest-quality healthcare experience possible.    Sincerely,     Sandra Ny FROILAN, B.S. Public Health  Clinic Care Coordination  [facility]:  [place], [place] and [place]  [phone]  [email or web address]

## 2025-05-28 NOTE — PROGRESS NOTES
Clinic Care Coordination Contact  [facility]/Voicemail    Clinical Data: Care Coordinator Outreach    Outreach Documentation Number of Outreach Attempt   5/27/2025   9:51 AM 1   5/28/2025   1:18 PM 2       Left message on patient's voicemail with call back information and requested return call.      Plan: Care Coordinator will send care coordination introduction letter with care coordinator contact information and explanation of care coordination services via [employer]. Care Coordinator will do no further outreaches at this time.    Sandra BURGOS, B.S. [facility] Care Coordination  [facility]:  [place], [place] and [place]  [phone]  [email or web address]

## (undated) DEVICE — KIT ENDO TURNOVER/PROCEDURE W/CLEAN A SCOPE LINERS 103888

## (undated) RX ORDER — FENTANYL CITRATE 50 UG/ML
INJECTION, SOLUTION INTRAMUSCULAR; INTRAVENOUS
Status: DISPENSED
Start: 2023-10-26